# Patient Record
Sex: MALE | Race: OTHER | NOT HISPANIC OR LATINO | Employment: OTHER | RURAL
[De-identification: names, ages, dates, MRNs, and addresses within clinical notes are randomized per-mention and may not be internally consistent; named-entity substitution may affect disease eponyms.]

---

## 2021-01-06 ENCOUNTER — HISTORICAL (OUTPATIENT)
Dept: ADMINISTRATIVE | Facility: HOSPITAL | Age: 66
End: 2021-01-06

## 2021-01-06 LAB — SARS-COV+SARS-COV-2 AG RESP QL IA.RAPID: NEGATIVE

## 2023-04-19 PROCEDURE — 88312 PATHOLOGY, DERMATOLOGY: ICD-10-PCS | Mod: 26,,, | Performed by: PATHOLOGY

## 2023-04-19 PROCEDURE — 88312 SPECIAL STAINS GROUP 1: CPT | Mod: 26,,, | Performed by: PATHOLOGY

## 2023-04-19 PROCEDURE — 88305 PATHOLOGY, DERMATOLOGY: ICD-10-PCS | Mod: 26,,, | Performed by: PATHOLOGY

## 2023-04-19 PROCEDURE — 88305 TISSUE EXAM BY PATHOLOGIST: CPT | Mod: 26,,, | Performed by: PATHOLOGY

## 2023-04-19 PROCEDURE — 88305 TISSUE EXAM BY PATHOLOGIST: CPT | Mod: TC,SUR

## 2023-04-20 ENCOUNTER — LAB REQUISITION (OUTPATIENT)
Dept: LAB | Facility: HOSPITAL | Age: 68
End: 2023-04-20
Payer: MEDICARE

## 2023-04-20 DIAGNOSIS — D49.2 NEOPLASM OF UNSPECIFIED BEHAVIOR OF BONE, SOFT TISSUE, AND SKIN: ICD-10-CM

## 2023-04-21 LAB
ESTROGEN SERPL-MCNC: NORMAL PG/ML
INSULIN SERPL-ACNC: NORMAL U[IU]/ML
LAB AP GROSS DESCRIPTION: NORMAL
LAB AP LABORATORY NOTES: NORMAL
LAB AP SPEC A DDX: NORMAL
LAB AP SPEC A MORPHOLOGY: NORMAL
LAB AP SPEC A PROCEDURE: NORMAL
T3RU NFR SERPL: NORMAL %

## 2023-08-10 LAB — HBA1C MFR BLD: 7.9 % (ref 4–6)

## 2023-10-09 ENCOUNTER — OFFICE VISIT (OUTPATIENT)
Dept: FAMILY MEDICINE | Facility: CLINIC | Age: 68
End: 2023-10-09
Payer: MEDICARE

## 2023-10-09 DIAGNOSIS — E11.65 TYPE 2 DIABETES MELLITUS WITH HYPERGLYCEMIA, WITHOUT LONG-TERM CURRENT USE OF INSULIN: Chronic | ICD-10-CM

## 2023-10-09 DIAGNOSIS — I10 ESSENTIAL HYPERTENSION: Chronic | ICD-10-CM

## 2023-10-09 DIAGNOSIS — R06.83 SNORING: ICD-10-CM

## 2023-10-09 DIAGNOSIS — Z23 NEED FOR PNEUMOCOCCAL VACCINE: ICD-10-CM

## 2023-10-09 DIAGNOSIS — Z12.11 SCREENING FOR MALIGNANT NEOPLASM OF COLON: ICD-10-CM

## 2023-10-09 DIAGNOSIS — Z23 FLU VACCINE NEED: ICD-10-CM

## 2023-10-09 DIAGNOSIS — R53.83 OTHER FATIGUE: Primary | Chronic | ICD-10-CM

## 2023-10-09 DIAGNOSIS — L30.9 CHRONIC DERMATITIS: ICD-10-CM

## 2023-10-09 PROCEDURE — G0009 PNEUMOCOCCAL CONJUGATE VACCINE 20-VALENT: ICD-10-PCS | Mod: ,,, | Performed by: NURSE PRACTITIONER

## 2023-10-09 PROCEDURE — G0008 FLU VACCINE - QUADRIVALENT - ADJUVANTED: ICD-10-PCS | Mod: ,,, | Performed by: NURSE PRACTITIONER

## 2023-10-09 PROCEDURE — G0009 ADMIN PNEUMOCOCCAL VACCINE: HCPCS | Mod: ,,, | Performed by: NURSE PRACTITIONER

## 2023-10-09 PROCEDURE — G0008 ADMIN INFLUENZA VIRUS VAC: HCPCS | Mod: ,,, | Performed by: NURSE PRACTITIONER

## 2023-10-09 PROCEDURE — 90694 VACC AIIV4 NO PRSRV 0.5ML IM: CPT | Mod: ,,, | Performed by: NURSE PRACTITIONER

## 2023-10-09 PROCEDURE — 90677 PCV20 VACCINE IM: CPT | Mod: ,,, | Performed by: NURSE PRACTITIONER

## 2023-10-09 PROCEDURE — 90694 FLU VACCINE - QUADRIVALENT - ADJUVANTED: ICD-10-PCS | Mod: ,,, | Performed by: NURSE PRACTITIONER

## 2023-10-09 PROCEDURE — 99204 PR OFFICE/OUTPT VISIT, NEW, LEVL IV, 45-59 MIN: ICD-10-PCS | Mod: ,,, | Performed by: NURSE PRACTITIONER

## 2023-10-09 PROCEDURE — 90677 PNEUMOCOCCAL CONJUGATE VACCINE 20-VALENT: ICD-10-PCS | Mod: ,,, | Performed by: NURSE PRACTITIONER

## 2023-10-09 PROCEDURE — 99204 OFFICE O/P NEW MOD 45 MIN: CPT | Mod: ,,, | Performed by: NURSE PRACTITIONER

## 2023-10-09 RX ORDER — EMPAGLIFLOZIN 25 MG/1
25 TABLET, FILM COATED ORAL DAILY
COMMUNITY
Start: 2023-09-13 | End: 2024-03-26 | Stop reason: SDUPTHER

## 2023-10-09 RX ORDER — PIOGLITAZONEHYDROCHLORIDE 45 MG/1
45 TABLET ORAL DAILY
COMMUNITY
Start: 2023-09-13

## 2023-10-09 RX ORDER — OMEPRAZOLE 20 MG/1
20 CAPSULE, DELAYED RELEASE ORAL DAILY
COMMUNITY
Start: 2023-07-19 | End: 2024-03-26 | Stop reason: ALTCHOICE

## 2023-10-09 RX ORDER — FERROUS SULFATE 325(65) MG
325 TABLET ORAL
COMMUNITY

## 2023-10-09 RX ORDER — NIACIN 500 MG
250 CAPSULE, EXTENDED RELEASE ORAL NIGHTLY
COMMUNITY

## 2023-10-09 RX ORDER — ASPIRIN 325 MG
325 TABLET ORAL DAILY
COMMUNITY

## 2023-10-09 RX ORDER — GLIMEPIRIDE 4 MG/1
4 TABLET ORAL 2 TIMES DAILY
COMMUNITY
Start: 2023-07-19

## 2023-10-09 RX ORDER — CHOLECALCIFEROL (VITAMIN D3) 25 MCG
1 TABLET,CHEWABLE ORAL DAILY
COMMUNITY

## 2023-10-09 RX ORDER — NAPROXEN SODIUM 220 MG/1
1 TABLET ORAL 2 TIMES DAILY
COMMUNITY

## 2023-10-09 RX ORDER — EZETIMIBE 10 MG/1
10 TABLET ORAL DAILY
COMMUNITY
Start: 2023-07-19

## 2023-10-09 NOTE — ASSESSMENT & PLAN NOTE
Has been seeing Derm NP w/ Dr. Criss Ferris.  Had biopsy.  Rash persistent to RUE and desires 2nd opinion.

## 2023-10-09 NOTE — PATIENT INSTRUCTIONS
Patient Education       Preventing Falls   The Basics   Written by the doctors and editors at [facility]   Am I at risk of falling? -- Your risk of falling increases as you grow older. That's because getting older can make it harder to walk steadily and keep your balance. Also, the effects of falls are more serious in older people.  Overall, 3 to 4 out of every 10 people over the age of 65 fall each year. Up to 75 percent of people who fracture a hip never recover to the point they were before they had their fracture. If you have fallen in the past, you are at higher risk of falling again.  Several things can increase your risk of a fall, including:  Illness  A change in the medicines you take  An unsafe or unfamiliar setting (for example, a room with rugs or furniture that might trip you, or an area you don't know well)  How can my doctor help me to avoid falling? -- Your doctor can talk to you about the following things:  Past falls - It is important to tell your doctor about any times you have fallen or almost fallen. He or she can then suggest ways to prevent another fall.  Your health conditions - Some health problems can put you at risk of falling. These include conditions that affect eyesight, hearing, muscle strength, or balance.  The medicines you take - Certain medicines can increase the risk of falling. These include some medicines that are used for sleeping problems, anxiety, high blood pressure, or depression. Adding new medicines, or changing doses of some medicines, can also affect your risk of falling.  The more your doctor knows about your situation, the better he or she will be able to help you. For example, if you fell because you have a condition that causes pain, your doctor might suggest treatments to deal with the pain. Or if one of your medicines is making you dizzy and more likely to fall, your doctor might switch you to a different medicine.  Is there anything I can do on my own? -- Yes. To  help keep from falling, you can:  Make your home safer - To avoid falling at home, get rid of things that might make you trip or slip. This might include furniture, electrical cords, clutter, and loose rugs (figure 1). Keep your home well-lit so that you can easily see where you are going. Avoid storing things in high places so you don't have to reach or climb.  Wear sturdy shoes that fit well - Wearing shoes with high heels or slippery soles, or shoes that are too loose, can lead to falls. Walking around in bare feet, or only socks, can also increase your risk of falling.  Take vitamin D pills - Taking vitamin D might lower the risk of falls in older people. This is because vitamin D helps make bones and muscles stronger. Your doctor can talk to you about whether you should take extra vitamin D, and how much.  Stay active - Exercising on a regular basis can help lower your risk of falling. It might also help prevent you from getting hurt if you do fall. It is best to do a few different activities that help with both strength and balance. There are many kinds of exercise that can be safe for older people. These include walking, swimming, and Marcus Chi (a Chinese martial art that involves slow, gentle movements).  Use a cane, walker, and other safety devices - If your doctor recommends that you use a cane or walker, be sure that it's the right size and you know how to use it. There are other devices that might help you avoid falling, too. These include grab bars or a sturdy seat for the shower, non-slip bath mats, and hand rails or treads for the stairs (to prevent slipping).  If you worry that you could fall, there are also alarm buttons that let you call for help if you fall and can't get up.  What should I do if I fall? -- If you fall, see your doctor right away, even if you aren't hurt. Your doctor can try to figure out what caused you to fall, and how likely you are to fall again. He or she will do an exam and  talk to you about your health problems, medicines, and activities. Then he or she can suggest things you can do to avoid falling again.  Many older people have a hard time recovering after a fall. Doing things to prevent falling can help you to protect your health and independence.  All topics are updated as new evidence becomes available and our peer review process is complete.  This topic retrieved from [employer] on: Sep 21, 2021.  Topic 54521 Version 18.0  Release: 29.4.2 - C29.263  © 2021 UpToDate, Inc. and/or its affiliates. All rights reserved.  figure 1: How to avoid falling at home     This picture shows some of the things that can cause a fall in your home. Look around and remove any loose rugs, electrical cords, clutter, or furniture that could trip you.  Graphic 10205 Version 1.0    Consumer Information Use and Disclaimer   This information is not specific medical advice and does not replace information you receive from your health care provider. This is only a brief summary of general information. It does NOT include all information about conditions, illnesses, injuries, tests, procedures, treatments, therapies, discharge instructions or life-style choices that may apply to you. You must talk with your health care provider for complete information about your health and treatment options. This information should not be used to decide whether or not to accept your health care provider's advice, instructions or recommendations. Only your health care provider has the knowledge and training to provide advice that is right for you. The use of this information is governed by the [employer] End User License Agreement, available at https://www.[place].[employer]/en/solutions/[employer]/about/catrina.The use of [employer] content is governed by the [employer] Terms of Use. ©2021 UpToDate, Inc. All rights reserved.  Copyright   © 2021 UpToDate, Inc. and/or its affiliates. All rights reserved.  Patient Education       Type 2  "Diabetes   The Basics   Written by the doctors and editors at [facility]   What is type 2 diabetes? -- Type 2 diabetes is a disorder that disrupts the way your body uses sugar. It is sometimes called type 2 diabetes mellitus.  All the cells in your body need sugar to work normally. Sugar gets into the cells with the help of a hormone called insulin. Insulin is made by the pancreas, an organ in the belly. If there is not enough insulin, or if your body stops responding to insulin, sugar builds up in the blood. That is what happens to people with diabetes.  There are two different types of diabetes:   Type 1 diabetes - In type 1 diabetes, the pancreas does not make insulin or makes very little insulin.  Type 2 diabetes - In most people with type 2 diabetes, the body stops responding to insulin normally. Then, over time, the pancreas stops making enough insulin.   Being overweight or obese increases a person's risk of developing type 2 diabetes. But people who are not overweight can get diabetes, too.  What are the symptoms of type 2 diabetes? -- Type 2 diabetes usually causes no symptoms. When symptoms do occur, they include:  Needing to urinate often  Intense thirst  Blurry vision  Can diabetes lead to other health problems? -- Yes. Type 2 diabetes might not make you feel sick. But if it is not managed, it can lead to serious problems over time, such as:  Heart attacks  Strokes  Kidney disease  Vision problems (or even blindness)  Pain or loss of feeling in the hands and feet  Needing to have fingers, toes, or other body parts removed (amputated)  How do I know if I have type 2 diabetes? -- To find out if you have type 2 diabetes, your doctor or nurse can do a blood test. There are 2 tests that can be used for this. Both involve measuring the amount of sugar in your blood, called your "blood sugar" or "blood glucose":   One of the tests measures your blood sugar at the time the blood sample is taken. This test is done " "in the morning. You can't eat or drink anything except water for at least 8 hours before the test.   The other test shows what your average blood sugar has been for the past 2 to 3 months. This blood test is called "hemoglobin A1C" or just "A1C." It can be checked at any time of the day, even if you have recently eaten.  How is type 2 diabetes treated? -- The goals of treatment are to control your blood sugar and lower the risk of future problems that can happen in people with diabetes. An important part of managing diabetes is to eat healthy foods and get plenty of physical activity.  There are a few medicines that help control blood sugar. Some people need to take pills that help the body make more insulin or that help insulin do its job. Others need insulin shots.  Depending on what medicines you take, you might need to check your blood sugar regularly at home. But not everyone with type 2 diabetes needs to do this. Your doctor or nurse will tell you if you should be checking your blood sugar, and when and how to do this.  Sometimes, people with type 2 diabetes also need medicines to reduce the problems caused by the disease. For instance, medicines used to lower blood pressure can reduce the chances of a heart attack or stroke.  It's also important to get certain vaccines, such as vaccines to protect against the flu and coronavirus disease 2019 (COVID-19). Some people also need a vaccine to prevent pneumonia.  Can type 2 diabetes be prevented? -- Yes. To lower your chances of getting type 2 diabetes, the most important thing you can do is eat a healthy diet and get plenty of physical activity. This can help you lose weight if you are overweight. But eating well and being active are also good for your overall health. Even gentle activity, like walking, has benefits.  If you smoke, quitting can also lower your risk of type 2 diabetes. Quitting smoking can be hard to do, but your doctor or nurse can help.  All " topics are updated as new evidence becomes available and our peer review process is complete.  This topic retrieved from [employer] on: Sep 21, 2021.  Topic 59376 Version 14.0  Release: 29.4.2 - C29.263  © 2021 UpToDate, Inc. and/or its affiliates. All rights reserved.  Consumer Information Use and Disclaimer   This information is not specific medical advice and does not replace information you receive from your health care provider. This is only a brief summary of general information. It does NOT include all information about conditions, illnesses, injuries, tests, procedures, treatments, therapies, discharge instructions or life-style choices that may apply to you. You must talk with your health care provider for complete information about your health and treatment options. This information should not be used to decide whether or not to accept your health care provider's advice, instructions or recommendations. Only your health care provider has the knowledge and training to provide advice that is right for you. The use of this information is governed by the [employer] End User License Agreement, available at https://www.[employer]/en/solutions/[employer]/about/catrina.The use of [employer] content is governed by the [employer] Terms of Use. ©2021 UpToDate, Inc. All rights reserved.  Copyright   © 2021 UpToDate, Inc. and/or its affiliates. All rights reserved.  Patient Education       Lowering Your Risk of High Blood Pressure   About this topic   High blood pressure happens when your heart must work harder than normal to pump blood to the body. Blood pressure measures the pressure in your arteries when your heart beats. Your arteries are tubes that carry blood from your heart to the rest of your body. If the arteries are clogged, stiff, or squeeze too tightly, the pressure goes up and your heart must work harder than normal.  Your blood pressure has two numbers. The top number is the systolic number. It measures the  highest amount of pressure in the artery when the heart is beating. The second number or bottom number is the diastolic number. It is the lowest pressure in the artery when the heart is resting.  Sometimes diseases, like kidney disease or abnormal hormones, can lead to high blood pressure. Most people have no known cause or reason for high blood pressure.  In most cases, high blood pressure cannot be cured. You must control it with drugs and lifestyle changes. If high blood pressure is not controlled, it can lead to heart attack, stroke, and kidney problems.  Many people with high blood pressure do not feel sick and dont know that they have it until their blood pressure is checked. However, this does not mean that you do not need treatment for high blood pressure.  General   Many things can raise your chances of having high blood pressure. Some of them you can control and others you cannot. It is important to know about all of them.  Some health conditions may raise your chances for having high blood pressure. Take extra care and work with your doctor to keep these health problems under control. Your risk for high blood pressure is higher if you have:  Diabetes  Kidney disease  High cholesterol  Obstructive sleep apnea  Hormone problems - thyroid disease, Cushings syndrome, acromegaly, hyperaldosteronism, and pheochromocytoma  Lupus  Scleroderma  You may have control over some things that make you more likely to have high blood pressure. It is important to know about them and work to keep these problems under control. You are at a higher risk of high blood pressure if you:  Smoke or use tobacco  Use alcohol or illegal drugs  Do not exercise regularly  Are overweight  Have a lot of stress in your life  Have a poor diet or a diet high in salt or sodium  Have a diet low in potassium  Take certain medications  Some things you are not able to control, but they are important to know about. For example, men are more  likely to have high blood pressure before about age 60. However, women are more likely to have high blood pressure after menopause. You are also at a higher risk for high blood pressure if you:  Are older. Your risk gets higher the older you are.  Have a family history of high blood pressure  Are   What lifestyle changes are needed?   You may be asked to get a blood pressure monitor to use at home. Learn how to use it and record your blood pressure results between doctor visits.  Stop smoking and using tobacco. Smoking causes your arteries to narrow and raises your blood pressure. Talk to your doctor if you need help quitting.  Limit how much alcohol you drink to no more than 1 drink a day for women or 2 drinks a day for men.  Do not use illegal drugs. Talk to your doctor if you need help quitting.  If you are under a great deal of stress, ask your doctor for advice on how to lower the stress. You may need to learn a variety of stress reduction methods, such as yoga, meditation, guided imagery, donna chi, or even have a drug prescribed to help you.  Eat a healthy, well-balanced diet. Try to limit how much salt or sodium you eat each day.  Lose weight if you are overweight.  Get regular exercise. This can help your heart pump better, lower your blood pressure, and help you lose weight.  Work with your doctor to treat problems like sleep apnea, diabetes, high cholesterol, and kidney problems.  Talk with your doctor or pharmacist about all of your drugs, including over-the-counter medicines, to see if they may cause high blood pressure.  What drugs may be needed?   Your doctor may order drugs to:  Lower blood pressure  Control blood sugar  Lower cholesterol levels  Help with your mood  Help you stop using tobacco  Take your drugs exactly as ordered. Controlling problems like high blood pressure, diabetes, or high cholesterol are all ways to lower your chances of having high blood pressure.  Will physical  activity be limited?   It is good to get some kind of exercise each day. Walking, gardening, swimming, riding a bike, or dancing are all good ways to add exercise to your life. Always check with your doctor if you have questions about starting an exercise program.  What changes to diet are needed?   Eat a healthy diet. Talk to your doctor or a dietitian if you need to lose weight.  Do not use salt on your food. Use herbs and spices to improve the taste.  Do not eat more than 2.3 grams of sodium a day. If you have high blood pressure, aim for 1.5 grams of sodium a day. Read food labels to see how much sodium is in a food.  Limit coffee, tea, and soda to 2 cups (480 mL) a day.  Eat lots of fruits, vegetables, and low-fat dairy products.  Avoid fatty foods, like fried foods or chips.  Talk with your dietitian about the diet changes you need and the number of calories you should eat each day.  When do I need to call the doctor?   Activate the emergency medical system right away if you have signs of a heart attack. Call 911 in the [place] or [place]. The sooner treatment begins, the better your chances for recovery. Call for emergency help right away if you have:  Signs of heart attack:  Chest pain  Pain in other areas of the upper body (either or both arms, jaw, neck, etc.)  Trouble breathing  Fast heartbeat  Feeling dizzy  Signs of stroke:  Sudden numbness or weakness of the face, arm, or leg, especially on one side of the body  Sudden confusion, trouble speaking or understanding  Sudden trouble seeing in one or both eyes  Sudden trouble walking, dizziness, loss of balance or coordination  Sudden severe headache with no known cause  Face droops on one side  Call your doctor if you have:  Blood pressure that is 20 points higher than your normal top or bottom number  Two blood pressure readings higher than 180/120  Very bad headache  Confusion  Sudden change in hearing or eyesight  Nosebleed  Where can I learn more?    American Heart Association  https://www.heart.org/en/health-topics/high-blood-pressure/changes-you-can-make-to-manage-high-blood-pressure   American Heart Association  https://www.heart.org/en/health-topics/high-blood-pressure/why-high-blood-pressure-is-a-silent-killer/know-your-risk-factors-for-high-blood-pressure   Centers for Disease Control and Prevention  https://www.cdc.gov/bloodpressure/risk_factors.htm   NHS Choices  https://www.nhs.uk/conditions/high-blood-pressure-hypertension/   Last Reviewed Date   2020-03-26  Consumer Information Use and Disclaimer   This information is not specific medical advice and does not replace information you receive from your health care provider. This is only a brief summary of general information. It does NOT include all information about conditions, illnesses, injuries, tests, procedures, treatments, therapies, discharge instructions or life-style choices that may apply to you. You must talk with your health care provider for complete information about your health and treatment options. This information should not be used to decide whether or not to accept your health care providers advice, instructions or recommendations. Only your health care provider has the knowledge and training to provide advice that is right for you.  Copyright   Copyright © 2021 UpToDate, Inc. and its affiliates and/or licensors. All rights reserved.

## 2023-10-09 NOTE — PROGRESS NOTES
[facility] - FAMILY MEDICINE       PATIENT NAME: Hung Turner   : 1955    AGE: 68 y.o. DATE OF ENCOUNTER: 10/9/23    MRN: [id number]      PCP: No, Primary Doctor    Reason for Visit / Chief Complaint:  Diabetes (Patient presents to the clinic question about dm )         274}    Subjective:     HPI:    Presents with his wife to establish new PCP.  No old records available at time of visit.  Wasn't supposed to see LUIS Powers FNP again until February, but saw her early due to fatigue and requested labs 1 mth ago; saw Marj Luke NP until she left [facility] and she was the one who added Jardiance.       Dx w/ T2DM > 10 yrs ago.  Was on metformin for years, but didn't feel well.   Checks glucose most days of the week and glucose fluctuates a lot - since last week glucose lo 144 161 148 134 153.  Feels bad if glucose drops to 80s.  Feels best 110-120s.  Reports increased urination since starting Jardiance; 3-4x night nocturia.     Is supposed to wear compression stockings but hard to get them on.  No prior sleep study; per wife he snores.    Was started on iron, B12, & magnesium for low energy, but hasn't noticed any improvement.     Review of Systems:   Review of Systems   Constitutional:  Positive for fatigue.   Respiratory:  Positive for shortness of breath (with minimal exertion). Negative for cough and wheezing.    Cardiovascular: Negative.    Skin: Negative.        Allergies and Meds: 274}     Review of patient's allergies indicates:   Allergen Reactions    Codeine     Crestor [rosuvastatin]     Penicillins         Current Outpatient Medications:     aspirin 325 MG tablet, Take 325 mg by mouth once daily., Disp: , Rfl:     cyanocobalamin, vitamin B-12, 2,500 mcg Tab, Take 1 tablet by mouth once daily., Disp: , Rfl:     ezetimibe (ZETIA) 10 mg tablet, Take 10 mg by mouth once daily., Disp: , Rfl:     ferrous sulfate (FEOSOL) 325 mg (65 mg iron) Tab tablet, Take 325 mg by mouth daily with  "breakfast., Disp: , Rfl:     glimepiride (AMARYL) 4 MG tablet, Take 4 mg by mouth once daily., Disp: , Rfl:     JARDIANCE 25 mg tablet, Take 25 mg by mouth once daily., Disp: , Rfl:     magnesium citrate 85 mg Chew, Take 2 capsules by mouth nightly., Disp: , Rfl:     niacin 500 MG CpSR, Take 250 mg by mouth every evening., Disp: , Rfl:     omega 3-dha-epa-fish oil (FISH OIL) 1,200 (144-216) mg Cap, Take 1 capsule by mouth 2 (two) times a day., Disp: , Rfl:     omeprazole (PRILOSEC) 20 MG capsule, Take 20 mg by mouth once daily., Disp: , Rfl:     pioglitazone (ACTOS) 45 MG tablet, Take 45 mg by mouth once daily., Disp: , Rfl:     Medical History: 274}     Past Medical History:   Diagnosis Date    Type 2 diabetes mellitus with hyperglycemia, without long-term current use of insulin 10/09/2023      Social History     Tobacco Use   Smoking Status Never   Smokeless Tobacco Never      Past Surgical History:   Procedure Laterality Date    CATHETERIZATION OF BOTH LEFT AND RIGHT HEART      COSMETIC SURGERY      bone cartilage removal from chest    HERNIA REPAIR      WISDOM TOOTH EXTRACTION        Immunization History   Administered Date(s) Administered    Influenza (FLUAD) - Quadrivalent - Adjuvanted - PF *Preferred* (65+) 10/09/2023    Pneumococcal Conjugate - 20 Valent 10/09/2023     Objective:  274}   BP (!) 141/80 (BP Location: Right arm, Patient Position: Sitting, BP Method: Large (Automatic))   Pulse 80   Temp 98.5 °F (36.9 °C) (Oral)   Resp 20   Ht 5' 7" (1.702 m)   Wt 99.6 kg (219 lb 9.6 oz)   SpO2 97%   BMI 34.39 kg/m²     Wt Readings from Last 3 Encounters:   10/09/23 99.6 kg (219 lb 9.6 oz)     BP Readings from Last 3 Encounters:   10/09/23 (!) 141/80     Body mass index is 34.39 kg/m².     Physical Exam  Vitals and nursing note reviewed.   Constitutional:       General: He is not in acute distress.     Appearance: Normal appearance.   HENT:      Head: Normocephalic.      Right Ear: Tympanic membrane, ear " canal and external ear normal.      Left Ear: Tympanic membrane, ear canal and external ear normal.      Nose: Nose normal.      Mouth/Throat:      Mouth: Mucous membranes are moist.      Pharynx: Oropharynx is clear.   Eyes:      Conjunctiva/sclera: Conjunctivae normal.      Pupils: Pupils are equal, round, and reactive to light.   Neck:      Thyroid: No thyromegaly.      Vascular: Normal carotid pulses. No carotid bruit.   Cardiovascular:      Rate and Rhythm: Normal rate and regular rhythm.      Pulses: Normal pulses.      Heart sounds: Normal heart sounds.   Pulmonary:      Effort: Pulmonary effort is normal. No respiratory distress.      Breath sounds: Normal breath sounds.   Abdominal:      Palpations: Abdomen is soft.      Tenderness: There is no abdominal tenderness.   Musculoskeletal:      Cervical back: Neck supple.      Right lower leg: No edema.      Left lower leg: No edema.   Lymphadenopathy:      Cervical: No cervical adenopathy.   Skin:     General: Skin is warm and dry.   Neurological:      General: No focal deficit present.      Mental Status: He is alert and oriented to person, place, and time.   Psychiatric:         Mood and Affect: Mood normal.         Behavior: Behavior normal.          Assessment and Plan: 274}     1. Other fatigue    2. Type 2 diabetes mellitus with hyperglycemia, without long-term current use of insulin  Comments:  fluctuating glucose levels; request labs from former PCP and determine when A1c is due again and make possible treatment plan change(s)  Orders:  -     Ambulatory referral/consult to Sleep Disorders; Future; Expected date: 10/16/2023    3. Essential hypertension    4. Chronic dermatitis  Assessment & Plan:  Has been seeing Derm NP w/ Dr. Criss Ferris.  Had biopsy.  Rash persistent to RUE and desires 2nd opinion.    Orders:  -     Ambulatory referral/consult to Dermatology; Future; Expected date: 10/16/2023    5. Screening for malignant neoplasm of colon  -      Cologuard Screening (Multitarget Stool DNA); Future; Expected date: 10/09/2023    6. Flu vaccine need  -     Influenza - Quadrivalent (Adjuvanted)    7. Need for pneumococcal vaccine  -     Pneumococcal Conjugate Vaccine (20 Valent) (IM)    8. Snoring  -     Ambulatory referral/consult to Sleep Disorders; Future; Expected date: 10/16/2023    Get records from former PCP Lillian Powers NP and from [facility] for my review.  Will update labs as indicated after review.   Pt prefers Cologuard for colon screening.  High dose flu shot & Prevnar-20 today    Sleep study eval recommended.  We discussed the potential ramifications of untreated sleep apnea, which could include daytime fatigue, weight gain, nocturia, hypertension, heart disease including CHF, uncontrolled diabetes, increased risk for dementia/memory impairment, sudden death while sleeping and/or stroke. The patient should abstain from driving if he feels sleepy or drowsy.      Advised to check on getting Shingrix and TDAP at local pharmacy due to Medicare doesn't cover these vaccines in office.    Return to clinic 6 wk f/u T2DM, HTN, & fatigue; and sooner as needed.    Future Appointments   Date Time Provider Department Center   10/25/2023 10:00 AM AMBER NURSE, [facility] FAMILY MEDICINE [facility] DONNIE Melton        Signature:  HOSSEIN Lambert

## 2023-10-10 VITALS
SYSTOLIC BLOOD PRESSURE: 132 MMHG | OXYGEN SATURATION: 97 % | HEIGHT: 67 IN | DIASTOLIC BLOOD PRESSURE: 78 MMHG | TEMPERATURE: 99 F | RESPIRATION RATE: 20 BRPM | BODY MASS INDEX: 34.47 KG/M2 | HEART RATE: 80 BPM | WEIGHT: 219.63 LBS

## 2023-10-18 NOTE — PROGRESS NOTES
"[facility] FAMILY MEDICINE       PATIENT NAME: Hung Turner   : 1955    AGE: 68 y.o. DATE OF ENCOUNTER: 10/25/23    MRN: [id number]      Hung Turner presented for a  Medicare AWV and comprehensive Health Risk Assessment today. The following components were reviewed and updated:    Medical history  Family History  Social history  Allergies and Current Medications  Health Risk Assessment  Health Maintenance  Care Team         ** See Completed Assessments for Annual Wellness Visit within the encounter summary.**           The following assessments were completed:  Living Situation  CAGE  Depression Screening  Timed Get Up and Go  Whisper Test  Cognitive Function Screening  Nutrition Screening  ADL Screening  PAQ Screening        Vitals:    10/25/23 1004   BP: 124/76   BP Location: Right arm   Patient Position: Sitting   Pulse: 78   Resp: 16   Temp: 98.6 °F (37 °C)   TempSrc: Oral   SpO2: 96%   Weight: 101.2 kg (223 lb)   Height: 5' 8" (1.727 m)     Body mass index is 33.91 kg/m².  Physical Exam  Vitals and nursing note reviewed.   Constitutional:       General: He is not in acute distress.     Appearance: Normal appearance. He is not ill-appearing.   HENT:      Head: Normocephalic.   Eyes:      Conjunctiva/sclera: Conjunctivae normal.   Cardiovascular:      Rate and Rhythm: Normal rate and regular rhythm.      Heart sounds: Normal heart sounds.   Pulmonary:      Effort: Pulmonary effort is normal. No respiratory distress.      Breath sounds: Normal breath sounds.   Skin:     General: Skin is warm and dry.   Neurological:      Mental Status: He is alert and oriented to person, place, and time.             Diagnoses and health risks identified today and associated recommendations/orders:    1. Class 1 obesity due to excess calories with serious comorbidity and body mass index (BMI) of 33.0 to 33.9 in adult  Comments:  Healthy diet with reduced portions, increased physical activity, and " weight loss encouraged.    2. Encounter for subsequent annual wellness visit (AWV) in Medicare patient    3. Essential hypertension  Comments:  Controlled, continue current treatment.    4. Type 2 diabetes mellitus with hyperglycemia, without long-term current use of insulin  Assessment & Plan:  Last A1c 7.9% 8/10/23 per previous PCP.  Unable to tolerate metformin.  On glimepiride, pioglitazone, & Jardiance.      5. Need for vaccination  -     diphth,pertus,acell,,tetanus (BOOSTRIX) 2.5-8-5 Lf-mcg-Lf/0.5mL Susp; Inject 0.5 mLs into the muscle once. for 1 dose  Dispense: 0.5 mL; Refill: 0  -     varicella-zoster gE-AS01B, PF, (SHINGRIX, PF,) 50 mcg/0.5 mL injection; Inject 0.5 mLs into the muscle once. for 1 dose  Dispense: 1 each; Refill: 0    6. Screening for viral disease  -     Hepatitis C Antibody; Future; Expected date: 10/25/2023    7. BMI 33.0-33.9,adult    8. Snoring  Comments:  Appt pending for JUVE testing.  JUVE strongly suspected.  Overview:  Appt pending for JUVE testing.  JUVE strongly suspected.      9. Venous insufficiency (chronic) (peripheral)  Overview:  Mild, stable per Cardiologist, Dr. Ellison' notes.  Sleep study recommended and compression stockings.      10. Myalgia due to statin  Overview:  Unable to take statins.      11. Hypercholesteremia  Overview:  Can't take statins.  Fairly well controlled with zetia.        Provided Hung with a 5-10 year written screening schedule and personal prevention plan. Recommendations were developed using the USPSTF age appropriate recommendations. Education, counseling, and referrals were provided as needed. After Visit Summary printed and given to patient which includes a list of additional screenings\tests needed.    Follow up in about 1 year (around 10/25/2024).    HOSSEIN Lambert    Hep C Screening - drawn this visit, Tetanus vaccine - order sent to pharmacy, Shingles vaccine - order sent to pharmacy    I offered to discuss advanced care planning,  including how to pick a person who would make decisions for you if you were unable to make them for yourself, called a health care power of , and what kind of decisions you might make such as use of life sustaining treatments such as ventilators and tube feeding when faced with a life limiting illness recorded on a living will that they will need to know. (How you want to be cared for as you near the end of your natural life)     X Patient is interested in learning more about how to make advanced directives.  I provided them paperwork and offered to discuss this with them.

## 2023-10-25 ENCOUNTER — OFFICE VISIT (OUTPATIENT)
Dept: FAMILY MEDICINE | Facility: CLINIC | Age: 68
End: 2023-10-25
Payer: MEDICARE

## 2023-10-25 VITALS
HEART RATE: 78 BPM | SYSTOLIC BLOOD PRESSURE: 124 MMHG | BODY MASS INDEX: 33.8 KG/M2 | WEIGHT: 223 LBS | OXYGEN SATURATION: 96 % | RESPIRATION RATE: 16 BRPM | HEIGHT: 68 IN | DIASTOLIC BLOOD PRESSURE: 76 MMHG | TEMPERATURE: 99 F

## 2023-10-25 DIAGNOSIS — E78.00 HYPERCHOLESTEREMIA: Chronic | ICD-10-CM

## 2023-10-25 DIAGNOSIS — I10 ESSENTIAL HYPERTENSION: Chronic | ICD-10-CM

## 2023-10-25 DIAGNOSIS — Z23 NEED FOR VACCINATION: ICD-10-CM

## 2023-10-25 DIAGNOSIS — E11.65 TYPE 2 DIABETES MELLITUS WITH HYPERGLYCEMIA, WITHOUT LONG-TERM CURRENT USE OF INSULIN: Chronic | ICD-10-CM

## 2023-10-25 DIAGNOSIS — Z00.00 ENCOUNTER FOR SUBSEQUENT ANNUAL WELLNESS VISIT (AWV) IN MEDICARE PATIENT: ICD-10-CM

## 2023-10-25 DIAGNOSIS — E66.09 CLASS 1 OBESITY DUE TO EXCESS CALORIES WITH SERIOUS COMORBIDITY AND BODY MASS INDEX (BMI) OF 33.0 TO 33.9 IN ADULT: Primary | Chronic | ICD-10-CM

## 2023-10-25 DIAGNOSIS — M79.10 MYALGIA DUE TO STATIN: Chronic | ICD-10-CM

## 2023-10-25 DIAGNOSIS — T46.6X5A MYALGIA DUE TO STATIN: Chronic | ICD-10-CM

## 2023-10-25 DIAGNOSIS — Z11.59 SCREENING FOR VIRAL DISEASE: ICD-10-CM

## 2023-10-25 DIAGNOSIS — I87.2 VENOUS INSUFFICIENCY (CHRONIC) (PERIPHERAL): Chronic | ICD-10-CM

## 2023-10-25 DIAGNOSIS — R06.83 SNORING: ICD-10-CM

## 2023-10-25 PROBLEM — E66.811 CLASS 1 OBESITY DUE TO EXCESS CALORIES WITH SERIOUS COMORBIDITY AND BODY MASS INDEX (BMI) OF 33.0 TO 33.9 IN ADULT: Chronic | Status: ACTIVE | Noted: 2023-10-25

## 2023-10-25 LAB — HCV AB SER QL: NORMAL

## 2023-10-25 PROCEDURE — 86803 HEPATITIS C ANTIBODY: ICD-10-PCS | Mod: ,,, | Performed by: CLINICAL MEDICAL LABORATORY

## 2023-10-25 PROCEDURE — G0439 PR MEDICARE ANNUAL WELLNESS SUBSEQUENT VISIT: ICD-10-PCS | Mod: ,,, | Performed by: NURSE PRACTITIONER

## 2023-10-25 PROCEDURE — G0439 PPPS, SUBSEQ VISIT: HCPCS | Mod: ,,, | Performed by: NURSE PRACTITIONER

## 2023-10-25 PROCEDURE — 86803 HEPATITIS C AB TEST: CPT | Mod: ,,, | Performed by: CLINICAL MEDICAL LABORATORY

## 2023-10-25 RX ORDER — ZOSTER VACCINE RECOMBINANT, ADJUVANTED 50 MCG/0.5
0.5 KIT INTRAMUSCULAR ONCE
Qty: 1 EACH | Refills: 0 | Status: SHIPPED | OUTPATIENT
Start: 2023-10-25 | End: 2023-10-25

## 2023-10-25 NOTE — PATIENT INSTRUCTIONS
Counseling and Referral of Other Preventative  (Italic type indicates deductible and co-insurance are waived)    Patient Name: Hung Turner  Today's Date: 10/25/2023    Health Maintenance         Date Due Completion Date    Hepatitis C Screening Never done ---    COVID-19 Vaccine (1) Never done ---    TETANUS VACCINE Never done ---    Colorectal Cancer Screening Never done ---    Shingles Vaccine (1 of 2) Never done ---    Lipid Panel 08/10/2028 8/10/2023          Orders Placed This Encounter   Procedures    Hepatitis C Antibody       The following information is provided to all patients.  This information is to help you find resources for any of the problems found today that may be affecting your health:                Living healthy guide: www.[facility].[place].gov      Understanding Diabetes: www.diabetes.org      Eating healthy: www.cdc.gov/healthyweight      CDC home safety checklist: www.cdc.gov/steadi/patient.html      Agency on Aging: www.goea.[place].[facility]      Alcoholics anonymous (AA): www.aa.org      Physical Activity: www.maynor.nih.gov/mz1hwak      Tobacco use: www.quitwithusla.org       *WALE Barajas FNP - appointment Tues -Nov. 21, 2023 at 11:20 am *

## 2023-10-26 ENCOUNTER — PATIENT OUTREACH (OUTPATIENT)
Dept: ADMINISTRATIVE | Facility: HOSPITAL | Age: 68
End: 2023-10-26

## 2023-10-26 ENCOUNTER — TELEPHONE (OUTPATIENT)
Dept: FAMILY MEDICINE | Facility: CLINIC | Age: 68
End: 2023-10-26
Payer: MEDICARE

## 2023-10-26 LAB — NONINV COLON CA DNA+OCC BLD SCRN STL QL: POSITIVE

## 2023-10-26 NOTE — TELEPHONE ENCOUNTER
----- Message from HOSSEIN Lambert sent at 10/25/2023  7:48 PM CDT -----  Hasn't heard from sleep lab yet??

## 2023-10-26 NOTE — PROGRESS NOTES
Call pt and review results.  Cologuard is positive.  He needs a diagnostic colonoscopy.  Does he have an established GI doctor or a preference of who he sees?  Let him know the sleep lab clinic will be working on referrals tomorrow that they only do this on Fridays.

## 2023-10-26 NOTE — PROGRESS NOTES
Population Health Chart Review & Patient Outreach Details    Outreach via Chart Review/Record Upload    Updates Requested / Reviewed:  [x]      Health Maintenance Topics Addressed and Outreach Outcomes / Actions Taken:           HbA1c & Other Labs [x]  External Records Uploaded (Abstracted August 2023 A1c from )       [x]  Will collect Microalbumin at next visit

## 2023-10-26 NOTE — ASSESSMENT & PLAN NOTE
Last A1c 7.9% 8/10/23 per previous PCP.  Unable to tolerate metformin.  On glimepiride, pioglitazone, & Jardiance.

## 2023-10-27 NOTE — PROGRESS NOTES
Patient notified of results.  No preference in GI doctor.  Notified sleep clinic should be calling cause they do referrals only on Fridays.

## 2023-10-30 DIAGNOSIS — R19.5 POSITIVE COLORECTAL CANCER SCREENING USING COLOGUARD TEST: Primary | ICD-10-CM

## 2023-11-09 DIAGNOSIS — Z71.89 COMPLEX CARE COORDINATION: ICD-10-CM

## 2023-11-21 ENCOUNTER — OFFICE VISIT (OUTPATIENT)
Dept: FAMILY MEDICINE | Facility: CLINIC | Age: 68
End: 2023-11-21
Payer: MEDICARE

## 2023-11-21 VITALS
DIASTOLIC BLOOD PRESSURE: 80 MMHG | OXYGEN SATURATION: 96 % | BODY MASS INDEX: 33.1 KG/M2 | WEIGHT: 218.38 LBS | SYSTOLIC BLOOD PRESSURE: 139 MMHG | RESPIRATION RATE: 20 BRPM | HEIGHT: 68 IN | HEART RATE: 79 BPM | TEMPERATURE: 99 F

## 2023-11-21 DIAGNOSIS — R53.83 OTHER FATIGUE: Chronic | ICD-10-CM

## 2023-11-21 DIAGNOSIS — E11.65 TYPE 2 DIABETES MELLITUS WITH HYPERGLYCEMIA, WITHOUT LONG-TERM CURRENT USE OF INSULIN: Primary | Chronic | ICD-10-CM

## 2023-11-21 DIAGNOSIS — E61.1 IRON DEFICIENCY: ICD-10-CM

## 2023-11-21 DIAGNOSIS — M79.10 MYALGIA DUE TO STATIN: Chronic | ICD-10-CM

## 2023-11-21 DIAGNOSIS — T46.6X5A MYALGIA DUE TO STATIN: Chronic | ICD-10-CM

## 2023-11-21 DIAGNOSIS — Z12.5 PROSTATE CANCER SCREENING: ICD-10-CM

## 2023-11-21 LAB
ANION GAP SERPL CALCULATED.3IONS-SCNC: 16 MMOL/L (ref 7–16)
BUN SERPL-MCNC: 17 MG/DL (ref 7–18)
BUN/CREAT SERPL: 19 (ref 6–20)
CALCIUM SERPL-MCNC: 9.8 MG/DL (ref 8.5–10.1)
CHLORIDE SERPL-SCNC: 104 MMOL/L (ref 98–107)
CO2 SERPL-SCNC: 25 MMOL/L (ref 21–32)
CREAT SERPL-MCNC: 0.9 MG/DL (ref 0.7–1.3)
CREAT UR-MCNC: 32 MG/DL (ref 39–259)
EGFR (NO RACE VARIABLE) (RUSH/TITUS): 93 ML/MIN/1.73M2
EST. AVERAGE GLUCOSE BLD GHB EST-MCNC: 206 MG/DL
FERRITIN SERPL-MCNC: 56 NG/ML (ref 26–388)
GLUCOSE SERPL-MCNC: 190 MG/DL (ref 74–106)
HBA1C MFR BLD HPLC: 8.8 % (ref 4.5–6.6)
IRON SATN MFR SERPL: 24 % (ref 14–50)
IRON SERPL-MCNC: 105 ΜG/DL (ref 65–175)
MICROALBUMIN UR-MCNC: <0.5 MG/DL (ref 0–2.8)
MICROALBUMIN/CREAT RATIO PNL UR: <15.6 MG/G (ref 0–30)
POTASSIUM SERPL-SCNC: 4.5 MMOL/L (ref 3.5–5.1)
PSA SERPL-MCNC: 1.59 NG/ML
SODIUM SERPL-SCNC: 140 MMOL/L (ref 136–145)
TIBC SERPL-MCNC: 432 ΜG/DL (ref 250–450)

## 2023-11-21 PROCEDURE — 82728 FERRITIN: ICD-10-PCS | Mod: ,,, | Performed by: CLINICAL MEDICAL LABORATORY

## 2023-11-21 PROCEDURE — 82043 MICROALBUMIN / CREATININE RATIO URINE: ICD-10-PCS | Mod: ,,, | Performed by: CLINICAL MEDICAL LABORATORY

## 2023-11-21 PROCEDURE — 83540 IRON AND TIBC: ICD-10-PCS | Mod: ,,, | Performed by: CLINICAL MEDICAL LABORATORY

## 2023-11-21 PROCEDURE — 99214 PR OFFICE/OUTPT VISIT, EST, LEVL IV, 30-39 MIN: ICD-10-PCS | Mod: ,,, | Performed by: NURSE PRACTITIONER

## 2023-11-21 PROCEDURE — 82570 MICROALBUMIN / CREATININE RATIO URINE: ICD-10-PCS | Mod: ,,, | Performed by: CLINICAL MEDICAL LABORATORY

## 2023-11-21 PROCEDURE — 82570 ASSAY OF URINE CREATININE: CPT | Mod: ,,, | Performed by: CLINICAL MEDICAL LABORATORY

## 2023-11-21 PROCEDURE — G0103 PSA, SCREENING: ICD-10-PCS | Mod: ,,, | Performed by: CLINICAL MEDICAL LABORATORY

## 2023-11-21 PROCEDURE — 80048 BASIC METABOLIC PNL TOTAL CA: CPT | Mod: ,,, | Performed by: CLINICAL MEDICAL LABORATORY

## 2023-11-21 PROCEDURE — 82043 UR ALBUMIN QUANTITATIVE: CPT | Mod: ,,, | Performed by: CLINICAL MEDICAL LABORATORY

## 2023-11-21 PROCEDURE — 99214 OFFICE O/P EST MOD 30 MIN: CPT | Mod: ,,, | Performed by: NURSE PRACTITIONER

## 2023-11-21 PROCEDURE — 82728 ASSAY OF FERRITIN: CPT | Mod: ,,, | Performed by: CLINICAL MEDICAL LABORATORY

## 2023-11-21 PROCEDURE — G0103 PSA SCREENING: HCPCS | Mod: ,,, | Performed by: CLINICAL MEDICAL LABORATORY

## 2023-11-21 PROCEDURE — 83036 HEMOGLOBIN GLYCOSYLATED A1C: CPT | Mod: ,,, | Performed by: CLINICAL MEDICAL LABORATORY

## 2023-11-21 PROCEDURE — 83036 HEMOGLOBIN A1C: ICD-10-PCS | Mod: ,,, | Performed by: CLINICAL MEDICAL LABORATORY

## 2023-11-21 PROCEDURE — 83550 IRON AND TIBC: ICD-10-PCS | Mod: ,,, | Performed by: CLINICAL MEDICAL LABORATORY

## 2023-11-21 PROCEDURE — 80048 BASIC METABOLIC PANEL: ICD-10-PCS | Mod: ,,, | Performed by: CLINICAL MEDICAL LABORATORY

## 2023-11-21 PROCEDURE — 83550 IRON BINDING TEST: CPT | Mod: ,,, | Performed by: CLINICAL MEDICAL LABORATORY

## 2023-11-21 PROCEDURE — 83540 ASSAY OF IRON: CPT | Mod: ,,, | Performed by: CLINICAL MEDICAL LABORATORY

## 2023-11-21 NOTE — PROGRESS NOTES
[facility] - FAMILY MEDICINE       PATIENT NAME: Hung Turner   : 1955    AGE: 68 y.o. DATE OF ENCOUNTER: 23    MRN: [id number]      PCP: Jil Barajas FNP    Reason for Visit / Chief Complaint:  Follow-up (Patient presents to the clinic for a 6wk f/u t2dm htn and fatigue), Hypertension, and Diabetes (Eye exam would like appointment. )         274}    Subjective:     HPI:    Presents with his wife for f/u uncontrolled T2DM, HTN, & fatigue.    Not monitoring glucose lately.  Walking some; taking iron, fatigue improving - having more good days than bad..    Had positive cologuard & has GI appt scheduled    Review of Systems:   Review of Systems   Constitutional: Negative.    Respiratory:  Positive for shortness of breath (with exertion). Negative for cough and wheezing.    Cardiovascular: Negative.    Skin: Negative.    Neurological: Negative.        Allergies and Meds: 274}     Review of patient's allergies indicates:   Allergen Reactions    Codeine     Penicillins Rash     Note: - [employer]rees[employer] 2018    Rosuvastatin Rash     Note: - [employer]rees[employer] 2018        Current Outpatient Medications:     aspirin 325 MG tablet, Take 325 mg by mouth once daily., Disp: , Rfl:     cyanocobalamin, vitamin B-12, 2,500 mcg Tab, Take 1 tablet by mouth once daily., Disp: , Rfl:     ezetimibe (ZETIA) 10 mg tablet, Take 10 mg by mouth once daily., Disp: , Rfl:     ferrous sulfate (FEOSOL) 325 mg (65 mg iron) Tab tablet, Take 325 mg by mouth daily with breakfast., Disp: , Rfl:     glimepiride (AMARYL) 4 MG tablet, Take 4 mg by mouth 2 (two) times a day., Disp: , Rfl:     JARDIANCE 25 mg tablet, Take 25 mg by mouth once daily., Disp: , Rfl:     magnesium citrate 85 mg Chew, Take 2 capsules by mouth nightly., Disp: , Rfl:     niacin 500 MG CpSR, Take 250 mg by mouth every evening., Disp: , Rfl:     omega 3-dha-epa-fish oil (FISH OIL) 1,200 (144-216) mg Cap, Take 1 capsule by mouth 2 (two) times a  "day., Disp: , Rfl:     omeprazole (PRILOSEC) 20 MG capsule, Take 20 mg by mouth once daily., Disp: , Rfl:     pioglitazone (ACTOS) 45 MG tablet, Take 45 mg by mouth once daily., Disp: , Rfl:     Medical History: 274}     Past Medical History:   Diagnosis Date    Type 2 diabetes mellitus with hyperglycemia, without long-term current use of insulin 10/09/2023      Social History     Tobacco Use   Smoking Status Never    Passive exposure: Never   Smokeless Tobacco Never      Past Surgical History:   Procedure Laterality Date    CATHETERIZATION OF BOTH LEFT AND RIGHT HEART      COSMETIC SURGERY      bone cartilage removal from chest    HERNIA REPAIR      WISDOM TOOTH EXTRACTION          Health Maintenance: 274}     Health Maintenance         Date Due Completion Date    COVID-19 Vaccine (1) Never done ---    Eye Exam Never done ---    RSV Vaccine (Age 60+ and Pregnant patients) (1 - 1-dose 60+ series) Never done ---    Shingles Vaccine (2 of 2) 01/09/2024 11/14/2023    Hemoglobin A1c 02/21/2024 11/21/2023    Lipid Panel 08/10/2024 8/10/2023    PROSTATE-SPECIFIC ANTIGEN 11/21/2024 11/21/2023    Diabetes Urine Screening 11/21/2024 11/21/2023    Foot Exam 11/21/2024 11/21/2023    Colorectal Cancer Screening 10/19/2026 10/19/2023    TETANUS VACCINE 03/05/2030 3/5/2020          Immunization History   Administered Date(s) Administered    Influenza (FLUAD) - Quadrivalent - Adjuvanted - PF *Preferred* (65+) 11/08/2022, 10/09/2023    Pneumococcal Conjugate - 20 Valent 10/09/2023    Tdap 03/05/2020    Zoster Recombinant 11/14/2023     Objective:  274}   /80 (BP Location: Right arm, Patient Position: Sitting, BP Method: Large (Automatic))   Pulse 79   Temp 98.9 °F (37.2 °C) (Oral)   Resp 20   Ht 5' 8" (1.727 m)   Wt 99.1 kg (218 lb 6.4 oz)   SpO2 96%   BMI 33.21 kg/m²     Wt Readings from Last 3 Encounters:   11/21/23 99.1 kg (218 lb 6.4 oz)   10/25/23 101.2 kg (223 lb)   10/09/23 99.6 kg (219 lb 9.6 oz)     BP " Readings from Last 3 Encounters:   11/21/23 139/80   10/25/23 124/76   10/10/23 132/78     Body mass index is 33.21 kg/m².     Physical Exam  Vitals and nursing note reviewed.   Constitutional:       General: He is not in acute distress.     Appearance: Normal appearance.   HENT:      Head: Normocephalic.   Eyes:      Conjunctiva/sclera: Conjunctivae normal.      Pupils: Pupils are equal, round, and reactive to light.   Neck:      Thyroid: No thyromegaly.      Vascular: No carotid bruit.      Trachea: Trachea normal.   Cardiovascular:      Rate and Rhythm: Normal rate and regular rhythm.      Pulses:           Dorsalis pedis pulses are 3+ on the right side and 3+ on the left side.        Posterior tibial pulses are 3+ on the right side and 3+ on the left side.      Heart sounds: Normal heart sounds.   Pulmonary:      Effort: Pulmonary effort is normal. No respiratory distress.      Breath sounds: Normal breath sounds.   Musculoskeletal:      Cervical back: Neck supple.      Right lower leg: No edema.      Left lower leg: No edema.      Right foot: Normal range of motion. No deformity or bunion.      Left foot: Normal range of motion. No deformity or bunion.   Feet:      Right foot:      Protective Sensation: 6 sites tested.  6 sites sensed.      Skin integrity: Skin integrity normal. No ulcer, blister, erythema, warmth or callus.      Toenail Condition: Right toenails are abnormally thick and long.      Left foot:      Protective Sensation: 6 sites tested.  6 sites sensed.      Skin integrity: Skin integrity normal. No ulcer, blister, erythema, warmth or callus.      Toenail Condition: Left toenails are abnormally thick and long.   Lymphadenopathy:      Cervical: No cervical adenopathy.      Upper Body:      Right upper body: No supraclavicular adenopathy.      Left upper body: No supraclavicular adenopathy.   Skin:     General: Skin is warm and dry.      Findings: No rash.   Neurological:      General: No focal  deficit present.      Mental Status: He is alert and oriented to person, place, and time.   Psychiatric:         Mood and Affect: Mood normal.         Behavior: Behavior normal.          Assessment and Plan: 274}     1. Type 2 diabetes mellitus with hyperglycemia, without long-term current use of insulin  Assessment & Plan:  Last A1c 7.9% 8/10/23 per former PCP.   Not monitoring glucose at home lately.  Currently taking jardiance 25 mg dly, glimepiride 4 mg 2x/day, and pioglitazone 45 mg dly.  Check A1c today.   Urine for MA/creat ratio today.  Advised to update diabetic eye exam. Referral entered to schedule appt at [facility].    Orders:  -     Ambulatory referral/consult to Optometry; Future; Expected date: 11/28/2023  -     Hemoglobin A1C; Future; Expected date: 11/21/2023  -     Basic Metabolic Panel; Future; Expected date: 11/21/2023  -     Microalbumin/Creatinine Ratio, Urine; Future; Expected date: 11/21/2023    2. Prostate cancer screening  -     PSA, Screening; Future; Expected date: 11/21/2023    3. Iron deficiency  Comments:  F/u labs.  Continue iron.  Orders:  -     Iron and TIBC; Future; Expected date: 11/21/2023  -     Ferritin; Future; Expected date: 11/21/2023    4. Myalgia due to statin  Overview:  Unable to take statins.      5. Other fatigue  Comments:  Improving.  Continue iron.  Keep sleep study appt as scheduled to plan for JUVE eval.       Return to clinic 3 mth f/u uncontrolled T2DM; and sooner as needed.    Future Appointments   Date Time Provider Department Center   12/20/2023  9:00 AM Diana Colindres FNP [facility]   1/2/2024  2:45 PM Luba Baird MD [facility]   3/4/2024  1:20 PM Jil Barajas FNP [facility] DONNIE [facility]   10/30/2024 10:00 AM AWV NURSE, [facility] FAMILY MEDICINE [facility] DONNIE Melton        Signature:  HOSSEIN Lambert

## 2023-11-26 PROBLEM — E61.1 IRON DEFICIENCY: Status: ACTIVE | Noted: 2023-11-26

## 2023-11-26 NOTE — ASSESSMENT & PLAN NOTE
Last A1c 7.9% 8/10/23 per former PCP.   Not monitoring glucose at home lately.  Currently taking jardiance 25 mg dly, glimepiride 4 mg 2x/day, and pioglitazone 45 mg dly.  Check A1c today.   Urine for MA/creat ratio today.  Advised to update diabetic eye exam. Referral entered to schedule appt at [facility].

## 2023-11-28 NOTE — PROGRESS NOTES
Call pt and review results.  A1c is too high and has increased from 7.9-8.8% with a glucose of 190.  With the meds he is currently on, our treatment options are: 1. see if we can get a weekly SC GLP-1 covered such as Ozempic, Mounjaro, or Trulicity, or daily oral Rybelsus. 2. Add basal insulin.  Would he like to see the diabetic educator to see if there are things he can modify in his diet to help improve his levels?  Iron/ferritin improved, continue iron daily.  PSA normal.

## 2023-11-28 NOTE — PROGRESS NOTES
Patient notified of results. Patient states he has seen a diabetic educator in the past.  Would like to discuss options with wife

## 2023-12-04 LAB
LEFT EYE DM RETINOPATHY: NEGATIVE
RIGHT EYE DM RETINOPATHY: NEGATIVE

## 2023-12-06 DIAGNOSIS — E11.65 TYPE 2 DIABETES MELLITUS WITH HYPERGLYCEMIA, WITHOUT LONG-TERM CURRENT USE OF INSULIN: Primary | Chronic | ICD-10-CM

## 2023-12-06 RX ORDER — SEMAGLUTIDE 0.68 MG/ML
0.5 INJECTION, SOLUTION SUBCUTANEOUS
Qty: 9 ML | Refills: 1 | Status: SHIPPED | OUTPATIENT
Start: 2023-12-06 | End: 2024-01-23 | Stop reason: DRUGHIGH

## 2023-12-06 NOTE — PROGRESS NOTES
Okay I will send a rx so we can see if it is covered, but let's give him a sample to make sure he can tolerate it 1st.    Caution him that he will have to decrease portions because if he eats too much it will cause severe nausea.  Nausea is the most common side effect but it tends to subside over the 1st few weeks; it can also cause constipation.  It is best to eat very small meals more frequently and make sure to drink plenty of water.  Ozempic 0.25 mg weekly x 4 wks then increase to 0.5 mg weekly.

## 2023-12-07 ENCOUNTER — PATIENT OUTREACH (OUTPATIENT)
Dept: ADMINISTRATIVE | Facility: HOSPITAL | Age: 68
End: 2023-12-07

## 2023-12-07 NOTE — PROGRESS NOTES
Population Health Chart Review & Patient Outreach Details    Updates Requested / Reviewed:  [x]  Care Team Updated    Health Maintenance Topics Addressed and Outreach Outcomes / Actions Taken:          Diabetic Eye Exam [x] HM Updated with December 2023 Eye Exam (Dr. Alvarez). History Updated.

## 2023-12-18 DIAGNOSIS — G47.30 SLEEP APNEA, UNSPECIFIED: Primary | ICD-10-CM

## 2023-12-20 ENCOUNTER — OFFICE VISIT (OUTPATIENT)
Dept: GASTROENTEROLOGY | Facility: CLINIC | Age: 68
End: 2023-12-20
Payer: MEDICARE

## 2023-12-20 VITALS
HEIGHT: 68 IN | DIASTOLIC BLOOD PRESSURE: 73 MMHG | SYSTOLIC BLOOD PRESSURE: 128 MMHG | WEIGHT: 214.38 LBS | HEART RATE: 81 BPM | BODY MASS INDEX: 32.49 KG/M2

## 2023-12-20 DIAGNOSIS — R19.5 POSITIVE COLORECTAL CANCER SCREENING USING COLOGUARD TEST: ICD-10-CM

## 2023-12-20 PROCEDURE — 99214 OFFICE O/P EST MOD 30 MIN: CPT | Mod: PBBFAC | Performed by: NURSE PRACTITIONER

## 2023-12-20 PROCEDURE — 99214 OFFICE O/P EST MOD 30 MIN: CPT | Mod: S$PBB,,, | Performed by: NURSE PRACTITIONER

## 2023-12-20 PROCEDURE — 99214 PR OFFICE/OUTPT VISIT, EST, LEVL IV, 30-39 MIN: ICD-10-PCS | Mod: S$PBB,,, | Performed by: NURSE PRACTITIONER

## 2023-12-20 NOTE — PROGRESS NOTES
Hung Turner is a 68 y.o. male here for No chief complaint on file.        PCP: Jil Barajas  Referring Provider: Jil Barajas, [facility]  [address]  [facility]  [place] [address]     HPI:    Presents in referral due to positive Cologuard.  Patient reports that he has not ever had a colonoscopy.  No family history of colon cancer that he is aware of.  No history of colon polyps.  He has never had a colonoscopy screening.  He denies any GI complaints today.  No weight loss, no hematochezia or melena. No anticoagulation.  Denies any comorbid conditions with the exception of diabetes.          ROS:  Review of Systems   Constitutional:  Negative for activity change, appetite change, fatigue, fever and unexpected weight change.   HENT:  Negative for trouble swallowing.    Gastrointestinal:  Negative for abdominal distention, abdominal pain, blood in stool, change in bowel habit, constipation, diarrhea, nausea, vomiting, reflux and fecal incontinence.   Musculoskeletal:  Negative for gait problem.   Integumentary:  Negative for color change.   Psychiatric/Behavioral:  Negative for sleep disturbance. The patient is not nervous/anxious.           PMHX:  has a past medical history of Diabetic eye exam (12/04/2023) and Type 2 diabetes mellitus with hyperglycemia, without long-term current use of insulin (10/09/2023).    PSHX:  has a past surgical history that includes Cosmetic surgery; Hernia repair; [place] tooth extraction; and Catheterization of both left and right heart.    PFHX: family history includes Cancer in his brother; Diabetes in his mother; Heart attack in his father; No Known Problems in his brother.    PSlHX:  reports that he has never smoked. He has never been exposed to tobacco smoke. He has never used smokeless tobacco. He reports that he does not drink alcohol and does not use drugs.        Review of patient's allergies indicates:   Allergen Reactions    Codeine      "Penicillins Rash     Note: - Phreesia 01/08/2018    Rosuvastatin Rash     Note: - Phreesia 01/08/2018       Medication List with Changes/Refills   Current Medications    ASPIRIN 325 MG TABLET    Take 325 mg by mouth once daily.    CYANOCOBALAMIN, VITAMIN B-12, 2,500 MCG TAB    Take 1 tablet by mouth once daily.    EZETIMIBE (ZETIA) 10 MG TABLET    Take 10 mg by mouth once daily.    FERROUS SULFATE (FEOSOL) 325 MG (65 MG IRON) TAB TABLET    Take 325 mg by mouth daily with breakfast.    GLIMEPIRIDE (AMARYL) 4 MG TABLET    Take 4 mg by mouth 2 (two) times a day.    JARDIANCE 25 MG TABLET    Take 25 mg by mouth once daily.    MAGNESIUM CITRATE 85 MG CHEW    Take 2 capsules by mouth nightly.    NIACIN 500 MG CPSR    Take 250 mg by mouth every evening.    OMEGA 3-DHA-EPA-FISH OIL (FISH OIL) 1,200 (144-216) MG CAP    Take 1 capsule by mouth 2 (two) times a day.    OMEPRAZOLE (PRILOSEC) 20 MG CAPSULE    Take 20 mg by mouth once daily.    PIOGLITAZONE (ACTOS) 45 MG TABLET    Take 45 mg by mouth once daily.    SEMAGLUTIDE (OZEMPIC) 0.25 MG OR 0.5 MG (2 MG/3 ML) PEN INJECTOR    Inject 0.5 mg into the skin every 7 days. Start with 0.25 mg weekly x 4 weeks then 0.5 mg weekly        Objective Findings:  Vital Signs:  /73   Pulse 81   Ht 5' 8" (1.727 m)   Wt 97.3 kg (214 lb 6.4 oz)   BMI 32.60 kg/m²  Body mass index is 32.6 kg/m².    Physical Exam:  Physical Exam  Vitals and nursing note reviewed.   Constitutional:       General: He is not in acute distress.     Appearance: Normal appearance.   HENT:      Mouth/Throat:      Mouth: Mucous membranes are moist.   Cardiovascular:      Rate and Rhythm: Normal rate.   Pulmonary:      Effort: Pulmonary effort is normal.      Breath sounds: No wheezing, rhonchi or rales.   Abdominal:      General: Bowel sounds are normal. There is no distension.      Palpations: Abdomen is soft. There is no mass.      Tenderness: There is no abdominal tenderness. There is no guarding.      " "Hernia: No hernia is present.   Skin:     General: Skin is warm and dry.      Coloration: Skin is not jaundiced or pale.   Neurological:      Mental Status: He is alert and oriented to person, place, and time.   Psychiatric:         Mood and Affect: Mood normal.          Labs:  No results found for: "WBC", "HGB", "HCT", "MCV", "RDW", "PLT", "GRAN", "LYMPH", "MONO", "EOS", "BASO"  Lab Results   Component Value Date     11/21/2023    K 4.5 11/21/2023     11/21/2023    CO2 25 11/21/2023     (H) 11/21/2023    BUN 17 11/21/2023    CREATININE 0.90 11/21/2023    CALCIUM 9.8 11/21/2023         Imaging: No results found.      Assessment:  Hung Turner is a 68 y.o. male here with:  1. Positive colorectal cancer screening using Cologuard test          Recommendations:  1.   Schedule colonoscopy for positive Cologuard  2.     Follow up if symptoms worsen or fail to improve.      Order summary:  Orders Placed This Encounter    Colonoscopy       Thank you for allowing me to participate in the care of Hung Turner.      CASSIA Hayes          "

## 2023-12-21 ENCOUNTER — PROCEDURE VISIT (OUTPATIENT)
Dept: SLEEP MEDICINE | Facility: HOSPITAL | Age: 68
End: 2023-12-21
Attending: INTERNAL MEDICINE
Payer: MEDICARE

## 2023-12-21 DIAGNOSIS — G47.30 SLEEP APNEA, UNSPECIFIED: ICD-10-CM

## 2023-12-21 PROCEDURE — G0399 HOME SLEEP TEST/TYPE 3 PORTA: HCPCS | Mod: PO

## 2024-01-22 ENCOUNTER — TELEPHONE (OUTPATIENT)
Dept: FAMILY MEDICINE | Facility: CLINIC | Age: 69
End: 2024-01-22
Payer: MEDICARE

## 2024-01-22 DIAGNOSIS — E11.65 TYPE 2 DIABETES MELLITUS WITH HYPERGLYCEMIA, WITHOUT LONG-TERM CURRENT USE OF INSULIN: Primary | Chronic | ICD-10-CM

## 2024-01-23 DIAGNOSIS — E11.65 TYPE 2 DIABETES MELLITUS WITH HYPERGLYCEMIA, WITHOUT LONG-TERM CURRENT USE OF INSULIN: Chronic | ICD-10-CM

## 2024-01-23 RX ORDER — SEMAGLUTIDE 1.34 MG/ML
1 INJECTION, SOLUTION SUBCUTANEOUS
Qty: 9 ML | Refills: 3 | Status: CANCELLED | OUTPATIENT
Start: 2024-01-23

## 2024-01-23 RX ORDER — SEMAGLUTIDE 1.34 MG/ML
1 INJECTION, SOLUTION SUBCUTANEOUS
Qty: 9 ML | Refills: 3 | Status: SHIPPED | OUTPATIENT
Start: 2024-01-23 | End: 2024-03-04 | Stop reason: SDUPTHER

## 2024-01-23 RX ORDER — SEMAGLUTIDE 1.34 MG/ML
1 INJECTION, SOLUTION SUBCUTANEOUS
Qty: 9 ML | Refills: 3 | Status: SHIPPED | OUTPATIENT
Start: 2024-01-23 | End: 2024-01-23 | Stop reason: SDUPTHER

## 2024-01-23 NOTE — TELEPHONE ENCOUNTER
My plan was to increase Ozempic to 1 mg weekly if he is tolerating the 0.5 mg okay.  Check with patient and see before I sent the rx.  Thanks!

## 2024-01-23 NOTE — TELEPHONE ENCOUNTER
----- Message from Jessica Leahy sent at 1/22/2024  5:07 PM CST -----  [phone]

## 2024-02-21 ENCOUNTER — ANESTHESIA EVENT (OUTPATIENT)
Dept: GASTROENTEROLOGY | Facility: HOSPITAL | Age: 69
End: 2024-02-21
Payer: MEDICARE

## 2024-02-21 ENCOUNTER — HOSPITAL ENCOUNTER (OUTPATIENT)
Dept: GASTROENTEROLOGY | Facility: HOSPITAL | Age: 69
Discharge: HOME | End: 2024-02-21
Attending: NURSE PRACTITIONER
Payer: MEDICARE

## 2024-02-21 ENCOUNTER — ANESTHESIA (OUTPATIENT)
Dept: GASTROENTEROLOGY | Facility: HOSPITAL | Age: 69
End: 2024-02-21
Payer: MEDICARE

## 2024-02-21 VITALS
DIASTOLIC BLOOD PRESSURE: 65 MMHG | RESPIRATION RATE: 20 BRPM | HEART RATE: 86 BPM | OXYGEN SATURATION: 95 % | SYSTOLIC BLOOD PRESSURE: 98 MMHG

## 2024-02-21 DIAGNOSIS — R19.5 POSITIVE COLORECTAL CANCER SCREENING USING COLOGUARD TEST: ICD-10-CM

## 2024-02-21 DIAGNOSIS — K57.30 DIVERTICULOSIS OF COLON: Primary | ICD-10-CM

## 2024-02-21 DIAGNOSIS — K63.5 POLYP OF TRANSVERSE COLON, UNSPECIFIED TYPE: ICD-10-CM

## 2024-02-21 LAB — GLUCOSE SERPL-MCNC: 151 MG/DL (ref 70–105)

## 2024-02-21 PROCEDURE — 63600175 PHARM REV CODE 636 W HCPCS: Performed by: NURSE ANESTHETIST, CERTIFIED REGISTERED

## 2024-02-21 PROCEDURE — 88305 TISSUE EXAM BY PATHOLOGIST: CPT | Mod: TC,SUR | Performed by: INTERNAL MEDICINE

## 2024-02-21 PROCEDURE — 37000008 HC ANESTHESIA 1ST 15 MINUTES

## 2024-02-21 PROCEDURE — 37000009 HC ANESTHESIA EA ADD 15 MINS

## 2024-02-21 PROCEDURE — 45380 COLONOSCOPY AND BIOPSY: CPT | Mod: PT,KX,, | Performed by: INTERNAL MEDICINE

## 2024-02-21 PROCEDURE — 82962 GLUCOSE BLOOD TEST: CPT

## 2024-02-21 PROCEDURE — 45380 COLONOSCOPY AND BIOPSY: CPT | Mod: PT,KX | Performed by: INTERNAL MEDICINE

## 2024-02-21 PROCEDURE — 88305 TISSUE EXAM BY PATHOLOGIST: CPT | Mod: 26,,, | Performed by: PATHOLOGY

## 2024-02-21 PROCEDURE — 27000284 HC CANNULA NASAL: Performed by: NURSE ANESTHETIST, CERTIFIED REGISTERED

## 2024-02-21 PROCEDURE — 25000003 PHARM REV CODE 250: Performed by: NURSE ANESTHETIST, CERTIFIED REGISTERED

## 2024-02-21 PROCEDURE — D9220A PRA ANESTHESIA: Mod: PT,,, | Performed by: NURSE ANESTHETIST, CERTIFIED REGISTERED

## 2024-02-21 PROCEDURE — 27201423 OPTIME MED/SURG SUP & DEVICES STERILE SUPPLY

## 2024-02-21 RX ORDER — PROPOFOL 10 MG/ML
VIAL (ML) INTRAVENOUS
Status: DISCONTINUED | OUTPATIENT
Start: 2024-02-21 | End: 2024-02-21

## 2024-02-21 RX ORDER — LIDOCAINE HYDROCHLORIDE 20 MG/ML
INJECTION, SOLUTION EPIDURAL; INFILTRATION; INTRACAUDAL; PERINEURAL
Status: DISCONTINUED | OUTPATIENT
Start: 2024-02-21 | End: 2024-02-21

## 2024-02-21 RX ORDER — SODIUM CHLORIDE 0.9 % (FLUSH) 0.9 %
10 SYRINGE (ML) INJECTION
Status: DISCONTINUED | OUTPATIENT
Start: 2024-02-21 | End: 2024-02-22 | Stop reason: HOSPADM

## 2024-02-21 RX ADMIN — LIDOCAINE HYDROCHLORIDE 50 MG: 20 INJECTION, SOLUTION INTRAVENOUS at 01:02

## 2024-02-21 RX ADMIN — PROPOFOL 40 MG: 10 INJECTION, EMULSION INTRAVENOUS at 01:02

## 2024-02-21 RX ADMIN — PROPOFOL 50 MG: 10 INJECTION, EMULSION INTRAVENOUS at 01:02

## 2024-02-21 RX ADMIN — PROPOFOL 30 MG: 10 INJECTION, EMULSION INTRAVENOUS at 01:02

## 2024-02-21 RX ADMIN — PROPOFOL 60 MG: 10 INJECTION, EMULSION INTRAVENOUS at 01:02

## 2024-02-21 RX ADMIN — SODIUM CHLORIDE: 9 INJECTION, SOLUTION INTRAVENOUS at 01:02

## 2024-02-21 NOTE — ANESTHESIA POSTPROCEDURE EVALUATION
Anesthesia Post Evaluation    Patient: Hung Turner    Procedure(s) Performed: * No procedures listed *    Final Anesthesia Type: general      Patient location during evaluation: PACU  Patient participation: Yes- Able to Participate  Level of consciousness: awake and alert and oriented  Post-procedure vital signs: reviewed and stable  Pain management: adequate  Airway patency: patent    PONV status at discharge: No PONV  Anesthetic complications: no      Cardiovascular status: blood pressure returned to baseline and hemodynamically stable  Respiratory status: unassisted  Hydration status: euvolemic  Follow-up not needed.  Comments: Refer to nursing note for pain/grady score upon discharge from recovery.              Vitals Value Taken Time   /63 02/21/24 1419   Temp  02/21/24 1438   Pulse 86 02/21/24 1420   Resp 17 02/21/24 1420   SpO2 95 % 02/21/24 1420   Vitals shown include unvalidated device data.      Event Time   Out of Recovery 14:26:12         Pain/Grady Score: Grady Score: 8 (2/21/2024  1:53 PM)

## 2024-02-21 NOTE — ANESTHESIA PREPROCEDURE EVALUATION
02/21/2024  Hung Turner is a 68 y.o., male.    Past Medical History:   Diagnosis Date    Diabetic eye exam 12/04/2023    Dr. Colten Alvarez, OD - [facility]    Type 2 diabetes mellitus with hyperglycemia, without long-term current use of insulin 10/09/2023       Past Surgical History:   Procedure Laterality Date    CATHETERIZATION OF BOTH LEFT AND RIGHT HEART      COSMETIC SURGERY      bone cartilage removal from chest    HERNIA REPAIR      WISDOM TOOTH EXTRACTION         Family History   Problem Relation Age of Onset    Diabetes Mother     Heart attack Father     Cancer Brother         lung, smoker    No Known Problems Brother        Social History     Socioeconomic History    Marital status:    Tobacco Use    Smoking status: Never     Passive exposure: Never    Smokeless tobacco: Never   Substance and Sexual Activity    Alcohol use: Never    Drug use: Never    Sexual activity: Not Currently     Partners: Female     Social Determinants of Health     Financial Resource Strain: Low Risk  (10/25/2023)    Overall Financial Resource Strain (CARDIA)     Difficulty of Paying Living Expenses: Not very hard   Food Insecurity: No Food Insecurity (10/25/2023)    Hunger Vital Sign     Worried About Running Out of Food in the Last Year: Never true     Ran Out of Food in the Last Year: Never true   Transportation Needs: No Transportation Needs (10/25/2023)    PRAPARE - Transportation     Lack of Transportation (Medical): No     Lack of Transportation (Non-Medical): No   Physical Activity: Inactive (10/25/2023)    Exercise Vital Sign     Days of Exercise per Week: 0 days     Minutes of Exercise per Session: 0 min   Stress: No Stress Concern Present (10/25/2023)    [employer] - Occupational Stress Questionnaire     Feeling of Stress : Not at all   Social Connections:  Socially Integrated (10/25/2023)    Social Connection and Isolation Panel [NHANES]     Frequency of Communication with Friends and Family: More than three times a week     Frequency of Social Gatherings with Friends and Family: Once a week     Attends Sikhism Services: More than 4 times per year     Active Member of Clubs or Organizations: Yes     Attends Club or Organization Meetings: More than 4 times per year     Marital Status:    Housing Stability: Low Risk  (10/25/2023)    Housing Stability Vital Sign     Unable to Pay for Housing in the Last Year: No     Number of Places Lived in the Last Year: 1     Unstable Housing in the Last Year: No       Current Outpatient Medications   Medication Sig Dispense Refill    aspirin 325 MG tablet Take 325 mg by mouth once daily.      cyanocobalamin, vitamin B-12, 2,500 mcg Tab Take 1 tablet by mouth once daily.      ezetimibe (ZETIA) 10 mg tablet Take 10 mg by mouth once daily.      ferrous sulfate (FEOSOL) 325 mg (65 mg iron) Tab tablet Take 325 mg by mouth daily with breakfast.      glimepiride (AMARYL) 4 MG tablet Take 4 mg by mouth 2 (two) times a day.      JARDIANCE 25 mg tablet Take 25 mg by mouth once daily.      magnesium citrate 85 mg Chew Take 2 capsules by mouth nightly.      niacin 500 MG CpSR Take 250 mg by mouth every evening.      omega 3-dha-epa-fish oil (FISH OIL) 1,200 (144-216) mg Cap Take 1 capsule by mouth 2 (two) times a day.      omeprazole (PRILOSEC) 20 MG capsule Take 20 mg by mouth once daily.      pioglitazone (ACTOS) 45 MG tablet Take 45 mg by mouth once daily.      semaglutide (OZEMPIC) 1 mg/dose (4 mg/3 mL) Inject 1 mg into the skin every 7 days. 9 mL 3     No current facility-administered medications for this encounter.       Review of patient's allergies indicates:   Allergen Reactions    Codeine     Penicillins Rash     Note: - Phreesia 01/08/2018    Rosuvastatin Rash     Note: - [employer]reesia 01/08/2018          Pre-op Assessment    I  have reviewed the Patient Summary Reports.     I have reviewed the Nursing Notes. I have reviewed the NPO Status.   I have reviewed the Medications.     Review of Systems  Anesthesia Hx:  No problems with previous Anesthesia                Social:  Non-Smoker       Cardiovascular:     Hypertension           hyperlipidemia                       Hypertension         Hepatic/GI:     GERD             Endocrine:  Diabetes    Diabetes                    Obesity / BMI > 30         Anesthesia Plan  Type of Anesthesia, risks & benefits discussed:    Anesthesia Type: Gen Natural Airway  Intra-op Monitoring Plan: Standard ASA Monitors  Post Op Pain Control Plan: IV/PO Opioids PRN  Induction:  IV  Informed Consent: Informed consent signed with the Patient and all parties understand the risks and agree with anesthesia plan.  All questions answered. Patient consented to blood products? Yes  ASA Score: 3  Day of Surgery Review of History & Physical: H&P Update referred to the surgeon/provider.I have interviewed and examined the patient. I have reviewed the patient's H&P dated: There are no significant changes.     Ready For Surgery From Anesthesia Perspective.     .

## 2024-02-21 NOTE — TRANSFER OF CARE
Anesthesia Transfer of Care Note    Patient: Hung Turner    Procedure(s) Performed: * No procedures listed *    Patient location: PACU    Anesthesia Type: general    Transport from OR: Transported from OR on room air with adequate spontaneous ventilation    Post pain: adequate analgesia    Post assessment: no apparent anesthetic complications and tolerated procedure well    Post vital signs: stable    Level of consciousness: alert and responds to stimulation    Nausea/Vomiting: no nausea/vomiting    Complications: none    Transfer of care protocol was followed      Last vitals: Visit Vitals  BP 98/65   Pulse 87   Resp 20   SpO2 96%

## 2024-02-21 NOTE — DISCHARGE INSTRUCTIONS
Procedure Date  2/21/24     Impression  Overall Impression:   Polyp in the transverse colon was removed with cold forceps biopsy  Diverticulosis in the descending colon and sigmoid colon  Diverticula were noted as above. Two diminutive polyps were removed with biopsy from the transverse colon.     Recommendation    Await pathology results     Repeat colonoscopy in 5 years      Discharge: disp: DC to home. Resume diet. No driving x 24 hours. Follow-up with PCP as scheduled.  Dx: colon diverticulosis, two polyps were removed during this exam.     Indication  Positive colorectal cancer screening using Cologuard test

## 2024-02-21 NOTE — H&P
[facility] Endoscopy  Gastroenterology  H&P    Patient Name: Hung Turner  MRN: [id number]  Admission Date: 2/21/2024  Code Status: No Order    Attending Provider: Diana Colindres FNP   Primary Care Physician: Jil Barajas FNP  Principal Problem:<principal problem not specified>    Subjective:     History of Present Illness: Pt presents for colonoscopy due to positive cologuard test. This is his first colonoscopy.    Past Medical History:   Diagnosis Date    Diabetic eye exam 12/04/2023    Dr. Colten Alvarez, OD - [facility]    PONV (postoperative nausea and vomiting)     Type 2 diabetes mellitus with hyperglycemia, without long-term current use of insulin 10/09/2023       Past Surgical History:   Procedure Laterality Date    CATHETERIZATION OF BOTH LEFT AND RIGHT HEART      COSMETIC SURGERY      bone cartilage removal from chest    HERNIA REPAIR      WISDOM TOOTH EXTRACTION         Review of patient's allergies indicates:   Allergen Reactions    Codeine     Penicillins Rash     Note: - Phreesia 01/08/2018    Rosuvastatin Rash     Note: - Phreesia 01/08/2018     Family History       Problem Relation (Age of Onset)    Cancer Brother    Diabetes Mother    Heart attack Father    No Known Problems Brother          Tobacco Use    Smoking status: Never     Passive exposure: Never    Smokeless tobacco: Never   Substance and Sexual Activity    Alcohol use: Never    Drug use: Never    Sexual activity: Not Currently     Partners: Female     Review of Systems   Respiratory: Negative.     Cardiovascular: Negative.    Gastrointestinal: Negative.      Objective:     Vital Signs (Most Recent):  Pulse: 95 (02/21/24 1233)  Resp: 16 (02/21/24 1233)  BP: 118/73 (02/21/24 1233)  SpO2: 96 % (02/21/24 1233) Vital Signs (24h Range):  Pulse:  [95] 95  Resp:  [16] 16  SpO2:  [96 %] 96 %  BP: (118)/(73) 118/73        There is no height or weight on file to calculate BMI.    No intake or output data in the 24  "hours ending 02/21/24 1313    Lines/Drains/Airways       Peripheral Intravenous Line  Duration                  Peripheral IV - Single Lumen 02/21/24 1233 22 G Left;Posterior Hand <1 day                    Physical Exam  Vitals reviewed.   Constitutional:       General: He is not in acute distress.     Appearance: Normal appearance. He is well-developed. He is not ill-appearing.   HENT:      Head: Normocephalic and atraumatic.      Nose: Nose normal.   Eyes:      Pupils: Pupils are equal, round, and reactive to light.   Cardiovascular:      Rate and Rhythm: Normal rate and regular rhythm.   Pulmonary:      Effort: Pulmonary effort is normal.      Breath sounds: Normal breath sounds. No wheezing.   Abdominal:      General: Abdomen is flat. Bowel sounds are normal. There is no distension.      Palpations: Abdomen is soft.      Tenderness: There is no abdominal tenderness. There is no guarding.   Skin:     General: Skin is warm and dry.      Coloration: Skin is not jaundiced.   Neurological:      Mental Status: He is alert.   Psychiatric:         Attention and Perception: Attention normal.         Mood and Affect: Affect normal.         Speech: Speech normal.         Behavior: Behavior is cooperative.      Comments: Pt was calm while speaking.         Significant Labs:  CBC: No results for input(s): "WBC", "HGB", "HCT", "PLT" in the last 48 hours.  CMP: No results for input(s): "GLU", "CALCIUM", "ALBUMIN", "PROT", "NA", "K", "CO2", "CL", "BUN", "CREATININE", "ALKPHOS", "ALT", "AST", "BILITOT" in the last 48 hours.    Significant Imaging:  Imaging results within the past 24 hours have been reviewed.    Assessment/Plan:     There are no hospital problems to display for this patient.        Imp: screening for colon cancer; positive cologuard test  Plan: colonoscopy    Saurabh Stearns MD  Gastroenterology  [facility] End[facility]  "

## 2024-02-22 LAB
ESTROGEN SERPL-MCNC: NORMAL PG/ML
INSULIN SERPL-ACNC: NORMAL U[IU]/ML
LAB AP GROSS DESCRIPTION: NORMAL
LAB AP LABORATORY NOTES: NORMAL
T3RU NFR SERPL: NORMAL %

## 2024-02-23 ENCOUNTER — TELEPHONE (OUTPATIENT)
Dept: GASTROENTEROLOGY | Facility: CLINIC | Age: 69
End: 2024-02-23
Payer: MEDICARE

## 2024-02-23 NOTE — TELEPHONE ENCOUNTER
Attempted to call results. Left message.          ----- Message from Saurabh Stearns MD sent at 2/22/2024  6:05 PM CST -----  Colon polyps were tubular adenomas.  Recommend: repeat colonoscopy in 5 years.

## 2024-02-23 NOTE — TELEPHONE ENCOUNTER
Results and recommendations called to patient. Verbalized understanding.              ----- Message from Saurabh Stearns MD sent at 2/22/2024  6:05 PM CST -----  Colon polyps were tubular adenomas.  Recommend: repeat colonoscopy in 5 years.

## 2024-03-04 ENCOUNTER — OFFICE VISIT (OUTPATIENT)
Dept: FAMILY MEDICINE | Facility: CLINIC | Age: 69
End: 2024-03-04
Payer: MEDICARE

## 2024-03-04 VITALS
WEIGHT: 209 LBS | BODY MASS INDEX: 31.67 KG/M2 | DIASTOLIC BLOOD PRESSURE: 75 MMHG | HEIGHT: 68 IN | RESPIRATION RATE: 20 BRPM | TEMPERATURE: 98 F | OXYGEN SATURATION: 96 % | HEART RATE: 80 BPM | SYSTOLIC BLOOD PRESSURE: 123 MMHG

## 2024-03-04 DIAGNOSIS — I10 ESSENTIAL HYPERTENSION: Chronic | ICD-10-CM

## 2024-03-04 DIAGNOSIS — E11.65 TYPE 2 DIABETES MELLITUS WITH HYPERGLYCEMIA, WITHOUT LONG-TERM CURRENT USE OF INSULIN: Primary | Chronic | ICD-10-CM

## 2024-03-04 LAB
ANION GAP SERPL CALCULATED.3IONS-SCNC: 14 MMOL/L (ref 7–16)
BUN SERPL-MCNC: 15 MG/DL (ref 7–18)
BUN/CREAT SERPL: 17 (ref 6–20)
CALCIUM SERPL-MCNC: 9.6 MG/DL (ref 8.5–10.1)
CHLORIDE SERPL-SCNC: 105 MMOL/L (ref 98–107)
CO2 SERPL-SCNC: 24 MMOL/L (ref 21–32)
CREAT SERPL-MCNC: 0.9 MG/DL (ref 0.7–1.3)
EGFR (NO RACE VARIABLE) (RUSH/TITUS): 93 ML/MIN/1.73M2
EST. AVERAGE GLUCOSE BLD GHB EST-MCNC: 174 MG/DL
GLUCOSE SERPL-MCNC: 203 MG/DL (ref 74–106)
HBA1C MFR BLD HPLC: 7.7 % (ref 4.5–6.6)
POTASSIUM SERPL-SCNC: 4.9 MMOL/L (ref 3.5–5.1)
SODIUM SERPL-SCNC: 138 MMOL/L (ref 136–145)

## 2024-03-04 PROCEDURE — 83036 HEMOGLOBIN GLYCOSYLATED A1C: CPT | Mod: ,,, | Performed by: CLINICAL MEDICAL LABORATORY

## 2024-03-04 PROCEDURE — 99214 OFFICE O/P EST MOD 30 MIN: CPT | Mod: ,,, | Performed by: NURSE PRACTITIONER

## 2024-03-04 PROCEDURE — 80048 BASIC METABOLIC PNL TOTAL CA: CPT | Mod: ,,, | Performed by: CLINICAL MEDICAL LABORATORY

## 2024-03-04 RX ORDER — SEMAGLUTIDE 1.34 MG/ML
1 INJECTION, SOLUTION SUBCUTANEOUS
Qty: 9 ML | Refills: 3 | Status: SHIPPED | OUTPATIENT
Start: 2024-03-04

## 2024-03-04 NOTE — PROGRESS NOTES
[facility] - FAMILY MEDICINE       PATIENT NAME: Hung Turner   : 1955    AGE: 68 y.o. DATE OF ENCOUNTER: 3/4/24    MRN: [id number]      PCP: Jil Barajas FNP    Reason for Visit / Chief Complaint:  Follow-up (Patient presents to the clinic for 3m f/u ) and Diabetes         274}    Subjective:     HPI:    Presents accompanied by his wife for 3-mth f/u uncontrolled T2DM since adding Ozempic along with Jardiance, glimepiride, and pioglitazone. No problems tolerating ozempic, just started 1 mg last week.    Checking FPG daily - 119 this am, 140s max.    CPAP working okay but still feels tired, not sure he is getting enough sleep, avg 6.5-7 hrs.  Sleep is often broken waking after 3-4 hours and staying awake for low awhile before going back to sleep.  Discussed sleep hygiene and can try melatonin gummies p.r.n.    Review of Systems:   Review of Systems   Constitutional: Negative.    Respiratory:  Positive for shortness of breath (with exertion). Negative for cough and wheezing.    Cardiovascular: Negative.    Gastrointestinal: Negative.    Skin: Negative.    Neurological: Negative.    Psychiatric/Behavioral: Negative.         Allergies and Meds: 274}     Review of patient's allergies indicates:   Allergen Reactions    Codeine     Penicillins Rash     Note: - Phreesia 2018    Rosuvastatin Rash     Note: - Phreesia 2018        Current Outpatient Medications:     aspirin 325 MG tablet, Take 325 mg by mouth once daily., Disp: , Rfl:     cyanocobalamin, vitamin B-12, 2,500 mcg Tab, Take 1 tablet by mouth once daily., Disp: , Rfl:     ezetimibe (ZETIA) 10 mg tablet, Take 10 mg by mouth once daily., Disp: , Rfl:     ferrous sulfate (FEOSOL) 325 mg (65 mg iron) Tab tablet, Take 325 mg by mouth daily with breakfast., Disp: , Rfl:     glimepiride (AMARYL) 4 MG tablet, Take 4 mg by mouth 2 (two) times a day., Disp: , Rfl:     JARDIANCE 25 mg tablet, Take 25 mg by mouth once daily.,  Disp: , Rfl:     magnesium citrate 85 mg Chew, Take 2 capsules by mouth nightly., Disp: , Rfl:     niacin 500 MG CpSR, Take 250 mg by mouth every evening., Disp: , Rfl:     omega 3-dha-epa-fish oil (FISH OIL) 1,200 (144-216) mg Cap, Take 1 capsule by mouth 2 (two) times a day., Disp: , Rfl:     omeprazole (PRILOSEC) 20 MG capsule, Take 20 mg by mouth once daily., Disp: , Rfl:     pioglitazone (ACTOS) 45 MG tablet, Take 45 mg by mouth once daily., Disp: , Rfl:     semaglutide (OZEMPIC) 1 mg/dose (4 mg/3 mL), Inject 1 mg into the skin every 7 days., Disp: 9 mL, Rfl: 3    Labs:274}   I have reviewed labs below:  Lab Results   Component Value Date     11/21/2023    K 4.5 11/21/2023     11/21/2023    CALCIUM 9.8 11/21/2023     (H) 11/21/2023    BUN 17 11/21/2023    CREATININE 0.90 11/21/2023    PSA 1.590 11/21/2023    HGBA1C 8.8 (H) 11/21/2023    MICROALBUR <0.5 11/21/2023       Medical History: 274}     Past Medical History:   Diagnosis Date    Diabetic eye exam 12/04/2023    Dr. Colten Alvarez, OD - [facility]    PONV (postoperative nausea and vomiting)     Type 2 diabetes mellitus with hyperglycemia, without long-term current use of insulin 10/09/2023      Social History     Tobacco Use   Smoking Status Never    Passive exposure: Never   Smokeless Tobacco Never      Past Surgical History:   Procedure Laterality Date    CATHETERIZATION OF BOTH LEFT AND RIGHT HEART      COSMETIC SURGERY      bone cartilage removal from chest    HERNIA REPAIR      WISDOM TOOTH EXTRACTION          Health Maintenance: 274}     Health Maintenance         Date Due Completion Date    COVID-19 Vaccine (1) Never done ---    RSV Vaccine (Age 60+ and Pregnant patients) (1 - 1-dose 60+ series) Never done ---    Shingles Vaccine (2 of 2) 01/09/2024 11/14/2023    Hemoglobin A1c 02/21/2024 11/21/2023    Lipid Panel 08/10/2024 8/10/2023    PROSTATE-SPECIFIC ANTIGEN 11/21/2024 11/21/2023    Diabetes Urine Screening  "11/21/2024 11/21/2023    Foot Exam 11/21/2024 11/21/2023    Eye Exam 12/04/2024 12/4/2023    Colorectal Cancer Screening 02/21/2029 2/21/2024    TETANUS VACCINE 03/05/2030 3/5/2020          Immunization History   Administered Date(s) Administered    Influenza (FLUAD) - Quadrivalent - Adjuvanted - PF *Preferred* (65+) 11/08/2022, 10/09/2023    Pneumococcal Conjugate - 20 Valent 10/09/2023    Tdap 03/05/2020    Zoster Recombinant 11/14/2023     Objective:  274}   /75 (BP Location: Right arm, Patient Position: Sitting, BP Method: Large (Automatic))   Pulse 80   Temp 98.3 °F (36.8 °C) (Oral)   Resp 20   Ht 5' 8" (1.727 m)   Wt 94.8 kg (209 lb)   SpO2 96%   BMI 31.78 kg/m²     Wt Readings from Last 3 Encounters:   03/04/24 94.8 kg (209 lb)   12/20/23 97.3 kg (214 lb 6.4 oz)   11/21/23 99.1 kg (218 lb 6.4 oz)     BP Readings from Last 3 Encounters:   03/04/24 123/75   02/21/24 98/65   12/20/23 128/73     Body mass index is 31.78 kg/m².     Physical Exam  Vitals and nursing note reviewed.   Constitutional:       General: He is not in acute distress.     Appearance: Normal appearance. He is not ill-appearing.   HENT:      Head: Normocephalic.   Eyes:      Conjunctiva/sclera: Conjunctivae normal.   Cardiovascular:      Rate and Rhythm: Normal rate and regular rhythm.      Heart sounds: Normal heart sounds.   Pulmonary:      Effort: Pulmonary effort is normal. No respiratory distress.      Breath sounds: Normal breath sounds. No wheezing, rhonchi or rales.   Musculoskeletal:      Right lower leg: No edema.      Left lower leg: No edema.   Skin:     General: Skin is warm and dry.      Coloration: Skin is not jaundiced or pale.   Neurological:      Mental Status: He is alert and oriented to person, place, and time.      Gait: Gait normal.   Psychiatric:         Mood and Affect: Mood normal.         Behavior: Behavior normal.         Thought Content: Thought content normal.         Judgment: Judgment normal.      "     Assessment and Plan: 274}     1. Type 2 diabetes mellitus with hyperglycemia, without long-term current use of insulin  Comments:  Not controlled, last A1c 8.8%.  Ozempic was added.    Recheck A1c today.  Orders:  -     Basic Metabolic Panel; Future; Expected date: 03/04/2024  -     Hemoglobin A1C; Future; Expected date: 03/04/2024  -     semaglutide (OZEMPIC) 1 mg/dose (4 mg/3 mL); Inject 1 mg into the skin every 7 days.  Dispense: 9 mL; Refill: 3    2. Essential hypertension  Comments:  Controlled, continue current treatment.    Encouraged continued weight loss.  Continue current treatment.  Advised if glucose begins running < 100 often, he is to let me know and I will decrease his pioglitazone dosage.    Return to clinic 4-mth f/u T2DM, fasting; and sooner as needed.  Sent message to Jessica to check on appointment because it is booked as a 3-mth nonfasting instead of a 4-mth fasting.    Future Appointments   Date Time Provider Department Center   3/12/2024  2:45 PM Luba Baird MD [facility] DERM [place]   6/11/2024  1:20 PM Jil Barajas FNP [facility] [place]   10/30/2024 10:00 AM AWV NURSE, [facility] MEDICINE [facility] [place]        Signature:  HOSSEIN Lambert

## 2024-03-05 NOTE — PROGRESS NOTES
Call pt and review results. Non-fasting glucose is a little high, but A1c is improving and should continue to improve since he just increased Ozempic to 1 mg last week.

## 2024-03-12 ENCOUNTER — OFFICE VISIT (OUTPATIENT)
Dept: DERMATOLOGY | Facility: CLINIC | Age: 69
End: 2024-03-12
Payer: MEDICARE

## 2024-03-12 DIAGNOSIS — L30.9 DERMATITIS, UNSPECIFIED: Primary | ICD-10-CM

## 2024-03-12 PROCEDURE — 99204 OFFICE O/P NEW MOD 45 MIN: CPT | Mod: ,,, | Performed by: STUDENT IN AN ORGANIZED HEALTH CARE EDUCATION/TRAINING PROGRAM

## 2024-03-12 RX ORDER — TRIAMCINOLONE ACETONIDE 1 MG/G
CREAM TOPICAL 2 TIMES DAILY
Qty: 454 G | Refills: 2 | Status: SHIPPED | OUTPATIENT
Start: 2024-03-12

## 2024-03-12 NOTE — PROGRESS NOTES
[facility]  Sven Baird MD    [address] [address]  [phone]    Fax: [phone]    Patient Name: Hung Turner  Medical Record Number: [id number]  PCP: Jil Barajas FNP  Age: 68 y.o. : 1955  Contact: [phone] (home)     CC: rash  History of Present Illness:     Hung Turner is a 68 y.o.  male with no history of skin cancer  who presents to clinic today for red pruritic rash on the right arm. This has been present for one year. Previous treatments includes Sarna lotion. 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     The patient has no other concerns today.    Review of Systems:     Unremarkable other than mentioned above.     Physical Exam:     General: Relaxed, oriented, alert    Skin examination of the scalp, face, neck, chest, back, abdomen, upper extremities and lower extremities were normal except for as listed below      Assessment and Plan:     1. Dermatitis Unspecified  - erythematous, eczematous patch on the right elbow   DDx: adult onset eczema vs ACD     Plan:  - TAC 0.1 % cream BID as needed.    Counseling  I counseled the patient regarding the following:  Skin care: Patient instructed to use gentle skin care including dove unscented soap, CeraVe moisturizing cream, and fragrance free laundry detergent.  Expectations: The patient understands that there is not a definitive diagnosis at this time. Further testing or empiric therapy may be necessary to diagnose and improve the condition.  Contact office if: The patient develops a fever, or rash dramatically worsens despite treatment.      Return to clinic in 8 weeks     AVS printed with patient instructions     Sven Baird MD   Mohs Surgery/Dermatologic Oncology  Dermatology

## 2024-03-19 ENCOUNTER — OFFICE VISIT (OUTPATIENT)
Dept: FAMILY MEDICINE | Facility: CLINIC | Age: 69
End: 2024-03-19
Payer: MEDICARE

## 2024-03-19 VITALS
DIASTOLIC BLOOD PRESSURE: 74 MMHG | HEART RATE: 73 BPM | BODY MASS INDEX: 31.37 KG/M2 | WEIGHT: 207 LBS | TEMPERATURE: 99 F | SYSTOLIC BLOOD PRESSURE: 115 MMHG | RESPIRATION RATE: 20 BRPM | OXYGEN SATURATION: 98 % | HEIGHT: 68 IN

## 2024-03-19 DIAGNOSIS — K21.9 GASTROESOPHAGEAL REFLUX DISEASE WITHOUT ESOPHAGITIS: Primary | Chronic | ICD-10-CM

## 2024-03-19 DIAGNOSIS — E11.65 TYPE 2 DIABETES MELLITUS WITH HYPERGLYCEMIA, WITHOUT LONG-TERM CURRENT USE OF INSULIN: Chronic | ICD-10-CM

## 2024-03-19 PROCEDURE — 99213 OFFICE O/P EST LOW 20 MIN: CPT | Mod: ,,, | Performed by: NURSE PRACTITIONER

## 2024-03-19 NOTE — PROGRESS NOTES
[facility] - FAMILY MEDICINE       PATIENT NAME: Hung Turner   : 1955    AGE: 68 y.o. DATE OF ENCOUNTER: 3/19/24    MRN: [id number]      PCP: Jil Barajas FNP    Reason for Visit / Chief Complaint:  Gastroesophageal Reflux (Patient presents to the clinic for acid reflux (bad))         274}    Subjective:     HPI:    Presents with his wife c/o reflux.    Reports after colonoscopy was belching a lot, resumed meds and began having bad reflux, then began belching and spitting up pieces of plastic.  Thinks it is Niacin ER capsule coating (filled at [employer], [employer]), stopped taking it 3 days ago, and has had no further reflux or belching up pieces.  Resuming omeprazole.  This is his 4th week of Ozempic 1 mg which is helping glucose - 100, 96.    Review of Systems:   Review of Systems   Constitutional: Negative.    Respiratory: Negative.     Cardiovascular: Negative.    Gastrointestinal:  Negative for abdominal pain, blood in stool, constipation, nausea and vomiting.   Neurological: Negative.        Allergies and Meds: 274}     Review of patient's allergies indicates:   Allergen Reactions    Codeine     Penicillins Rash     Note: - Phreesia 2018    Rosuvastatin Rash     Note: - Phreesia 2018        Current Outpatient Medications:     aspirin 325 MG tablet, Take 325 mg by mouth once daily., Disp: , Rfl:     cyanocobalamin, vitamin B-12, 2,500 mcg Tab, Take 1 tablet by mouth once daily., Disp: , Rfl:     ezetimibe (ZETIA) 10 mg tablet, Take 10 mg by mouth once daily., Disp: , Rfl:     ferrous sulfate (FEOSOL) 325 mg (65 mg iron) Tab tablet, Take 325 mg by mouth daily with breakfast., Disp: , Rfl:     glimepiride (AMARYL) 4 MG tablet, Take 4 mg by mouth 2 (two) times a day., Disp: , Rfl:     JARDIANCE 25 mg tablet, Take 25 mg by mouth once daily., Disp: , Rfl:     magnesium citrate 85 mg Chew, Take 2 capsules by mouth nightly., Disp: , Rfl:     niacin 500 MG CpSR, Take 250 mg  by mouth every evening., Disp: , Rfl:     omega 3-dha-epa-fish oil (FISH OIL) 1,200 (144-216) mg Cap, Take 1 capsule by mouth 2 (two) times a day., Disp: , Rfl:     omeprazole (PRILOSEC) 20 MG capsule, Take 20 mg by mouth once daily., Disp: , Rfl:     pioglitazone (ACTOS) 45 MG tablet, Take 45 mg by mouth once daily., Disp: , Rfl:     semaglutide (OZEMPIC) 1 mg/dose (4 mg/3 mL), Inject 1 mg into the skin every 7 days., Disp: 9 mL, Rfl: 3    triamcinolone acetonide 0.1% (KENALOG) 0.1 % cream, Apply topically 2 (two) times daily., Disp: 454 g, Rfl: 2    Labs:274}   I have reviewed labs below:  Lab Results   Component Value Date     03/04/2024    K 4.9 03/04/2024     03/04/2024    CALCIUM 9.6 03/04/2024     (H) 03/04/2024    BUN 15 03/04/2024    CREATININE 0.90 03/04/2024    PSA 1.590 11/21/2023    HGBA1C 7.7 (H) 03/04/2024    MICROALBUR <0.5 11/21/2023       Medical History: 274}     Past Medical History:   Diagnosis Date    Diabetic eye exam 12/04/2023    Dr. Colten Alvarez, OD - [facility]    PONV (postoperative nausea and vomiting)     Type 2 diabetes mellitus with hyperglycemia, without long-term current use of insulin 10/09/2023      Social History     Tobacco Use   Smoking Status Never    Passive exposure: Never   Smokeless Tobacco Never      Past Surgical History:   Procedure Laterality Date    CATHETERIZATION OF BOTH LEFT AND RIGHT HEART      COSMETIC SURGERY      bone cartilage removal from chest    HERNIA REPAIR      WISDOM TOOTH EXTRACTION          Health Maintenance: 274}     Health Maintenance         Date Due Completion Date    COVID-19 Vaccine (1) Never done ---    RSV Vaccine (Age 60+ and Pregnant patients) (1 - 1-dose 60+ series) Never done ---    Shingles Vaccine (2 of 2) 01/09/2024 11/14/2023    Hemoglobin A1c 06/04/2024 3/4/2024    Lipid Panel 08/10/2024 8/10/2023    PROSTATE-SPECIFIC ANTIGEN 11/21/2024 11/21/2023    Diabetes Urine Screening 11/21/2024 11/21/2023     "Foot Exam 11/21/2024 11/21/2023    Eye Exam 12/04/2024 12/4/2023    Colorectal Cancer Screening 02/21/2029 2/21/2024    TETANUS VACCINE 03/05/2030 3/5/2020          Immunization History   Administered Date(s) Administered    Influenza (FLUAD) - Quadrivalent - Adjuvanted - PF *Preferred* (65+) 11/08/2022, 10/09/2023    Pneumococcal Conjugate - 20 Valent 10/09/2023    Tdap 03/05/2020    Zoster Recombinant 11/14/2023     Objective:  274}   /74 (BP Location: Right arm, Patient Position: Sitting, BP Method: Large (Automatic))   Pulse 73   Temp 98.6 °F (37 °C) (Oral)   Resp 20   Ht 5' 8" (1.727 m)   Wt 93.9 kg (207 lb)   SpO2 98%   BMI 31.47 kg/m²     Wt Readings from Last 3 Encounters:   03/19/24 93.9 kg (207 lb)   03/04/24 94.8 kg (209 lb)   12/20/23 97.3 kg (214 lb 6.4 oz)     BP Readings from Last 3 Encounters:   03/19/24 115/74   03/04/24 123/75   02/21/24 98/65     Body mass index is 31.47 kg/m².     Physical Exam  Vitals and nursing note reviewed.   Constitutional:       General: He is not in acute distress.     Appearance: Normal appearance. He is not ill-appearing.   HENT:      Head: Normocephalic.   Eyes:      Conjunctiva/sclera: Conjunctivae normal.   Cardiovascular:      Rate and Rhythm: Normal rate and regular rhythm.      Heart sounds: Normal heart sounds.   Pulmonary:      Effort: Pulmonary effort is normal. No respiratory distress.      Breath sounds: Normal breath sounds. No wheezing, rhonchi or rales.   Abdominal:      Palpations: Abdomen is soft.      Tenderness: There is no abdominal tenderness.   Skin:     General: Skin is warm and dry.      Coloration: Skin is not jaundiced or pale.   Neurological:      Mental Status: He is alert and oriented to person, place, and time.      Gait: Gait normal.   Psychiatric:         Mood and Affect: Mood normal.         Behavior: Behavior normal.         Thought Content: Thought content normal.         Judgment: Judgment normal.          Assessment and " Plan: 274}     1. Gastroesophageal reflux disease without esophagitis  Comments:  improved since stopping niacin ER  continue omeprazole 20 mg daily    2. Type 2 diabetes mellitus with hyperglycemia, without long-term current use of insulin  Comments:  improving since increasing Ozempic to 1 mg weekly       Stop Niacin altogether and will check lipids in July as planned.    Return to clinic 7/3/24 as scheduled with fasting labs; and sooner as needed.    Future Appointments   Date Time Provider Department Center   4/25/2024  4:00 PM Luba Baird MD [facility]   7/3/2024 11:00 AM Jil Barajas FNP [facility] [place]   10/30/2024 10:00 AM AWV NURSE, [facility] MEDIC[facility]        Signature:  HOSSEIN Lambert

## 2024-03-26 DIAGNOSIS — K21.9 GASTROESOPHAGEAL REFLUX DISEASE WITHOUT ESOPHAGITIS: Primary | Chronic | ICD-10-CM

## 2024-03-26 DIAGNOSIS — E11.65 TYPE 2 DIABETES MELLITUS WITH HYPERGLYCEMIA, WITHOUT LONG-TERM CURRENT USE OF INSULIN: ICD-10-CM

## 2024-03-26 RX ORDER — PANTOPRAZOLE SODIUM 40 MG/1
40 TABLET, DELAYED RELEASE ORAL DAILY
Qty: 90 TABLET | Refills: 3 | Status: SHIPPED | OUTPATIENT
Start: 2024-03-26 | End: 2025-03-26

## 2024-03-26 RX ORDER — EMPAGLIFLOZIN 25 MG/1
25 TABLET, FILM COATED ORAL DAILY
Qty: 90 TABLET | Refills: 3 | Status: SHIPPED | OUTPATIENT
Start: 2024-03-26

## 2024-03-26 NOTE — TELEPHONE ENCOUNTER
Patient states he is still belching.  Quit taking the Niacin.  Quit taking the omeprazole on the 23rd states the capsule is not digesting.  Requesting medication that is not a capsule.

## 2024-03-26 NOTE — TELEPHONE ENCOUNTER
I changed it to Protonix/pantoprazole because I know that comes in a tablet and it is in the same family as omeprazole/Prilosec.

## 2024-04-25 ENCOUNTER — OFFICE VISIT (OUTPATIENT)
Dept: DERMATOLOGY | Facility: CLINIC | Age: 69
End: 2024-04-25
Payer: MEDICARE

## 2024-04-25 DIAGNOSIS — L20.9 ATOPIC DERMATITIS, UNSPECIFIED TYPE: Primary | ICD-10-CM

## 2024-04-25 PROCEDURE — 99213 OFFICE O/P EST LOW 20 MIN: CPT | Mod: ,,, | Performed by: STUDENT IN AN ORGANIZED HEALTH CARE EDUCATION/TRAINING PROGRAM

## 2024-04-25 NOTE — PROGRESS NOTES
[facility]  Sven Baird MD    [address], [place] [address]  [phone]    Fax: [phone]    Patient Name: Hung Turner  Medical Record Number: [id number]  PCP: Jil Barajas FNP  Age: 68 y.o. : 1955  Contact: [phone] (home)     History of Present Illness:     Hung Turner is a 68 y.o.  male here for follow up of dermatitis unspecified treated with TAC 0.1% cream that has improved.     The patient has no other concerns today.    Review of Systems:     Unremarkable other than mentioned above.     Physical Exam:     General: Relaxed, oriented, alert    Skin examination of the scalp, face, neck, chest, back, abdomen, upper extremities and lower extremities were normal except for as listed below      Assessment and Plan:     1. Atopic Dermatitis   - eczematous plaques and papules located on the arms with lichenification    Status: moderate     Plan:   Topical Steroid: TAC 0.1 % cream PRN    Counseling.    Skin care: Patient should bathe using lukewarm water with a mild cleanser and moisturize immediately after. Emollients should be applied at least 2-3 times daily. Avoid scented detergents or fabric softeners. Keep fingernails short. Avoid excessive hand washing.  Expectations: The patient is aware that eczema is chronic in nature and can improve with moisturizers and topical steroids and worsen with stress, scented soaps, detergents, scratching, dry skin, changes in weather and skin infections.    Contact office if: Eczema worsens or fails to improve despite several weeks of treatment; patient develops skin infections (such as: yellow honey colored crusts or cold sores).    Return to clinic in 1 year.     AVS printed with patient instructions     Sven Baird MD   Mohs Surgery/Dermatologic Oncology  Dermatology

## 2024-06-09 DIAGNOSIS — Z71.89 COMPLEX CARE COORDINATION: ICD-10-CM

## 2024-07-03 ENCOUNTER — OFFICE VISIT (OUTPATIENT)
Dept: FAMILY MEDICINE | Facility: CLINIC | Age: 69
End: 2024-07-03
Payer: MEDICARE

## 2024-07-03 VITALS
RESPIRATION RATE: 18 BRPM | BODY MASS INDEX: 32.31 KG/M2 | DIASTOLIC BLOOD PRESSURE: 79 MMHG | OXYGEN SATURATION: 96 % | SYSTOLIC BLOOD PRESSURE: 124 MMHG | HEART RATE: 78 BPM | HEIGHT: 68 IN | TEMPERATURE: 98 F | WEIGHT: 213.19 LBS

## 2024-07-03 DIAGNOSIS — E78.00 HYPERCHOLESTEREMIA: Chronic | ICD-10-CM

## 2024-07-03 DIAGNOSIS — E61.1 IRON DEFICIENCY: ICD-10-CM

## 2024-07-03 DIAGNOSIS — R53.83 OTHER FATIGUE: Chronic | ICD-10-CM

## 2024-07-03 DIAGNOSIS — I10 ESSENTIAL HYPERTENSION: Chronic | ICD-10-CM

## 2024-07-03 DIAGNOSIS — Z79.899 ENCOUNTER FOR LONG-TERM (CURRENT) USE OF OTHER MEDICATIONS: ICD-10-CM

## 2024-07-03 DIAGNOSIS — E11.65 TYPE 2 DIABETES MELLITUS WITH HYPERGLYCEMIA, WITHOUT LONG-TERM CURRENT USE OF INSULIN: Primary | Chronic | ICD-10-CM

## 2024-07-03 DIAGNOSIS — G47.33 OSA ON CPAP: ICD-10-CM

## 2024-07-03 DIAGNOSIS — E11.65 TYPE 2 DIABETES MELLITUS WITH HYPERGLYCEMIA, WITHOUT LONG-TERM CURRENT USE OF INSULIN: Primary | ICD-10-CM

## 2024-07-03 PROCEDURE — 99214 OFFICE O/P EST MOD 30 MIN: CPT | Mod: ,,, | Performed by: NURSE PRACTITIONER

## 2024-07-03 RX ORDER — PIOGLITAZONEHYDROCHLORIDE 45 MG/1
45 TABLET ORAL DAILY
Qty: 90 TABLET | Refills: 3 | Status: SHIPPED | OUTPATIENT
Start: 2024-07-03

## 2024-07-03 RX ORDER — MELATONIN 10 MG
1 CAPSULE ORAL NIGHTLY PRN
COMMUNITY

## 2024-07-03 RX ORDER — EZETIMIBE 10 MG/1
10 TABLET ORAL DAILY
Qty: 90 TABLET | Refills: 3 | Status: SHIPPED | OUTPATIENT
Start: 2024-07-03

## 2024-07-03 RX ORDER — GLIMEPIRIDE 4 MG/1
8 TABLET ORAL
Qty: 180 TABLET | Refills: 1 | Status: SHIPPED | OUTPATIENT
Start: 2024-07-03

## 2024-07-03 NOTE — ASSESSMENT & PLAN NOTE
Lab Results   Component Value Date    HGBA1C 7.7 (H) 03/04/2024     Taking glimepiride 4 mg daily (is on med list for 2x/day but states they only send 90 tabs). Sent glimepiride 2 daily but stay with 1 daily for now; however, if stays off Ozempic may need to increase.  Taking Jardiance 25 mg daily.  Taking pioglitazone 45 mg daily.  Slacked off Ozempic due to cost, last dose 6/24/24.  Had A1c this am with results pending.

## 2024-07-08 DIAGNOSIS — E03.9 HYPOTHYROIDISM (ACQUIRED): Primary | ICD-10-CM

## 2024-07-08 RX ORDER — LEVOTHYROXINE SODIUM 25 UG/1
25 TABLET ORAL
Qty: 90 TABLET | Refills: 0 | Status: SHIPPED | OUTPATIENT
Start: 2024-07-08 | End: 2025-07-08

## 2024-07-08 NOTE — ASSESSMENT & PLAN NOTE
Lab Results   Component Value Date    TSH 4.000 (H) 07/03/2024 07/08/2024 start levothyroxine 25 mcg daily and recheck TSH at next visit.

## 2024-09-09 DIAGNOSIS — E03.9 HYPOTHYROIDISM (ACQUIRED): Primary | ICD-10-CM

## 2024-09-09 DIAGNOSIS — E03.9 HYPOTHYROIDISM (ACQUIRED): Chronic | ICD-10-CM

## 2024-09-09 RX ORDER — LEVOTHYROXINE SODIUM 25 UG/1
25 TABLET ORAL
Qty: 90 TABLET | Refills: 3 | Status: SHIPPED | OUTPATIENT
Start: 2024-09-09 | End: 2025-09-09

## 2024-10-07 ENCOUNTER — OFFICE VISIT (OUTPATIENT)
Dept: FAMILY MEDICINE | Facility: CLINIC | Age: 69
End: 2024-10-07
Payer: MEDICARE

## 2024-10-07 VITALS
SYSTOLIC BLOOD PRESSURE: 124 MMHG | HEIGHT: 68 IN | OXYGEN SATURATION: 96 % | HEART RATE: 70 BPM | DIASTOLIC BLOOD PRESSURE: 79 MMHG | RESPIRATION RATE: 18 BRPM | BODY MASS INDEX: 33.25 KG/M2 | TEMPERATURE: 98 F | WEIGHT: 219.38 LBS

## 2024-10-07 DIAGNOSIS — E03.9 HYPOTHYROIDISM (ACQUIRED): Chronic | ICD-10-CM

## 2024-10-07 DIAGNOSIS — E11.65 TYPE 2 DIABETES MELLITUS WITH HYPERGLYCEMIA, WITHOUT LONG-TERM CURRENT USE OF INSULIN: Primary | Chronic | ICD-10-CM

## 2024-10-07 PROBLEM — R19.5 POSITIVE COLORECTAL CANCER SCREENING USING COLOGUARD TEST: Status: RESOLVED | Noted: 2023-10-30 | Resolved: 2024-10-07

## 2024-10-07 PROBLEM — E61.1 IRON DEFICIENCY: Status: RESOLVED | Noted: 2023-11-26 | Resolved: 2024-10-07

## 2024-10-07 LAB
ANION GAP SERPL CALCULATED.3IONS-SCNC: 13 MMOL/L (ref 7–16)
BUN SERPL-MCNC: 14 MG/DL (ref 7–18)
BUN/CREAT SERPL: 19 (ref 6–20)
CALCIUM SERPL-MCNC: 9 MG/DL (ref 8.5–10.1)
CHLORIDE SERPL-SCNC: 106 MMOL/L (ref 98–107)
CO2 SERPL-SCNC: 22 MMOL/L (ref 21–32)
CREAT SERPL-MCNC: 0.74 MG/DL (ref 0.7–1.3)
CREAT UR-MCNC: 122 MG/DL (ref 39–259)
EGFR (NO RACE VARIABLE) (RUSH/TITUS): 98 ML/MIN/1.73M2
EST. AVERAGE GLUCOSE BLD GHB EST-MCNC: 206 MG/DL
GLUCOSE SERPL-MCNC: 178 MG/DL (ref 74–106)
HBA1C MFR BLD HPLC: 8.8 % (ref 4.5–6.6)
MICROALBUMIN UR-MCNC: 1.9 MG/DL (ref 0–2.8)
MICROALBUMIN/CREAT RATIO PNL UR: 15.6 MG/G (ref 0–30)
POTASSIUM SERPL-SCNC: 4 MMOL/L (ref 3.5–5.1)
SODIUM SERPL-SCNC: 137 MMOL/L (ref 136–145)

## 2024-10-07 PROCEDURE — 83036 HEMOGLOBIN GLYCOSYLATED A1C: CPT | Mod: ,,, | Performed by: CLINICAL MEDICAL LABORATORY

## 2024-10-07 PROCEDURE — 80048 BASIC METABOLIC PNL TOTAL CA: CPT | Mod: ,,, | Performed by: CLINICAL MEDICAL LABORATORY

## 2024-10-07 PROCEDURE — 99214 OFFICE O/P EST MOD 30 MIN: CPT | Mod: ,,, | Performed by: NURSE PRACTITIONER

## 2024-10-07 PROCEDURE — 82043 UR ALBUMIN QUANTITATIVE: CPT | Mod: ,,, | Performed by: CLINICAL MEDICAL LABORATORY

## 2024-10-07 PROCEDURE — 82570 ASSAY OF URINE CREATININE: CPT | Mod: ,,, | Performed by: CLINICAL MEDICAL LABORATORY

## 2024-10-07 RX ORDER — LEVOTHYROXINE SODIUM 25 UG/1
25 TABLET ORAL
Qty: 90 TABLET | Refills: 3 | Status: SHIPPED | OUTPATIENT
Start: 2024-10-07 | End: 2025-10-07

## 2024-10-07 NOTE — ASSESSMENT & PLAN NOTE
Lab Results   Component Value Date    HGBA1C 6.4 07/03/2024     Only taking glimepiride 4 mg daily with breakfast rather than 2 tablets as prescribed since he is unable to get Jardiance or Ozempic due to cost.  Advised to increase to 2 tabs (8mg) daily with breakfast.    Continue pioglitazone 45 mg daily.    Is checking into patient assistance programs for Jardiance and Ozempic.  Not able to use optimal treatment plan due to cost of meds.    Recheck A1c today.     Latest Reference Range & Units 11/21/23 14:11 03/04/24 13:59 07/03/24 07:59   Hemoglobin A1C External 4.5 - 6.6 % 8.8 (H) 7.7 (H) 6.4   Estimated Avg Glucose mg/dL 206 174 137   Evidence above of how well the Jardiance and Ozempic were working to improve and control T2DM.

## 2024-10-07 NOTE — PROGRESS NOTES
[facility]  [address] [place] [address]  Phone: [phone]  Fax: [phone]       PATIENT NAME: Hung Turner   : 1955    AGE: 69 y.o. DATE OF ENCOUNTER: 10/7/24    MRN: [id number]      PCP: Jil Barajas FNP    Subjective:     Reason for Visit / Chief Complaint:     274}  Chief Complaint   Patient presents with    Diabetes     Patient reports to the clinic for 3 month follow up. He needs his levothyroxine sent to a different pharmacy due to not receiving it nearly a month ago. He would also like to check to see if he can get his Ozempic or Jardiance at a reduced cost.    Health Maintenance     Care gaps addressed, patient will have his Flu vaccine at a later time, eye exam due later this year.    Meera Lee CMA     Presents with wife for 3 mth f/u T2DM.  Glucose running high since not able to get jardiance or Ozempic due to cost.    209 188 186 173 185 170 210 189 161 185 210 180 198 250 248 94 188 159 121 135 119 168 153 160 147 138 176 159 154 138 197 150 141 132 137 149 100    Glucose was much better and felt better when he was taking Jardiance and Ozempic.    Never got levothyroxine from [facility] and wants to change to [facility].    Review of Systems:     Review of Systems   Constitutional:  Positive for fatigue and unexpected weight change. Negative for chills and fever.   Respiratory:  Positive for shortness of breath (with exertion). Negative for cough and wheezing.    Cardiovascular: Negative.    Gastrointestinal: Negative.    Skin: Negative.    Neurological: Negative.    Psychiatric/Behavioral: Negative.         Allergies and Meds: 274}     Review of patient's allergies indicates:   Allergen Reactions    Codeine     Penicillins Rash     Note: - [employer] 2018    Rosuvastatin Rash     Note: - [employer] 2018        Current Outpatient Medications   Medication Sig Dispense Refill    aspirin 325 MG tablet Take 325 mg  by mouth once daily.      cyanocobalamin, vitamin B-12, 2,500 mcg Tab Take 1 tablet by mouth once daily.      ezetimibe (ZETIA) 10 mg tablet Take 1 tablet (10 mg total) by mouth once daily. 90 tablet 3    ferrous sulfate (FEOSOL) 325 mg (65 mg iron) Tab tablet Take 325 mg by mouth daily with breakfast.      glimepiride (AMARYL) 4 MG tablet Take 2 tablets (8 mg total) by mouth daily with breakfast. 180 tablet 1    magnesium citrate 85 mg Chew Take 2 capsules by mouth nightly.      melatonin 10 mg Cap Take 1 capsule by mouth nightly as needed.      omega 3-dha-epa-fish oil (FISH OIL) 1,200 (144-216) mg Cap Take 1 capsule by mouth 2 (two) times a day.      pantoprazole (PROTONIX) 40 MG tablet Take 1 tablet (40 mg total) by mouth once daily. 90 tablet 3    pioglitazone (ACTOS) 45 MG tablet Take 1 tablet (45 mg total) by mouth once daily. 90 tablet 3    triamcinolone acetonide 0.1% (KENALOG) 0.1 % cream Apply topically 2 (two) times daily. 454 g 2    levothyroxine (SYNTHROID) 25 MCG tablet Take 1 tablet (25 mcg total) by mouth before breakfast. Take 1st thing every am w/ full glass water, wait at least 30 min for other meds or intake. 90 tablet 3     No current facility-administered medications for this visit.       Labs:274}   I have reviewed labs below:    Lab Results   Component Value Date    WBC 6.33 07/03/2024    RBC 5.31 07/03/2024    HGB 16.8 07/03/2024    HCT 51.2 07/03/2024     07/03/2024     07/03/2024    K 3.9 07/03/2024     (H) 07/03/2024    CALCIUM 9.1 07/03/2024     (H) 07/03/2024    BUN 21 (H) 07/03/2024    CREATININE 0.87 07/03/2024    ALT 33 07/03/2024    AST 20 07/03/2024    CHOL 213 (H) 07/03/2024    TRIG 209 (H) 07/03/2024    HDL 44 07/03/2024    LDLCALC 127 07/03/2024    TSH 1.960 09/09/2024    PSA 1.590 11/21/2023    HGBA1C 6.4 07/03/2024    MICROALBUR <0.5 11/21/2023       Medical History: 274}     Past Medical History:   Diagnosis Date    Diabetic eye exam 12/04/2023     "Dr. Colten Alvarez, OD - [facility]    Iron deficiency 11/26/2023    PONV (postoperative nausea and vomiting)     Positive colorectal cancer screening using Cologuard test 10/30/2023    Type 2 diabetes mellitus with hyperglycemia, without long-term current use of insulin 10/09/2023      Social History     Tobacco Use   Smoking Status Never    Passive exposure: Never   Smokeless Tobacco Never      Past Surgical History:   Procedure Laterality Date    CATHETERIZATION OF BOTH LEFT AND RIGHT HEART      COSMETIC SURGERY      bone cartilage removal from chest    HERNIA REPAIR      WISDOM TOOTH EXTRACTION          Health Maintenance:      Health Maintenance Topics with due status: Not Due       Topic Last Completion Date    TETANUS VACCINE 03/05/2020    PROSTATE-SPECIFIC ANTIGEN 11/21/2023    Diabetes Urine Screening 11/21/2023    Colorectal Cancer Screening 02/21/2024    Lipid Panel 07/03/2024    Hemoglobin A1c 07/03/2024       Objective:  274}   Vital Signs  Temp: 98 °F (36.7 °C)  Temp Source: Oral  Pulse: 70  Resp: 18  SpO2: 96 %  BP: 124/79  BP Location: Left arm  Patient Position: Sitting  Pain Score: 0-No pain  Height and Weight  Height: 5' 8" (172.7 cm)  Weight: 99.5 kg (219 lb 6.4 oz)  BSA (Calculated - sq m): 2.18 sq meters  BMI (Calculated): 33.4  Weight in (lb) to have BMI = 25: 164.1    Over the last two weeks how often have you been bothered by little interest or pleasure in doing things: 0  Over the last two weeks how often have you been bothered by feeling down, depressed or hopeless: 0  PHQ-2 Total Score: 0  PHQ-9 Score: 0  PHQ-9 Interpretation: Minimal or None    Wt Readings from Last 3 Encounters:   10/07/24 99.5 kg (219 lb 6.4 oz)   07/03/24 96.7 kg (213 lb 3.2 oz)   03/19/24 93.9 kg (207 lb)     Physical Exam  Vitals and nursing note reviewed.   Constitutional:       General: He is not in acute distress.     Appearance: Normal appearance. He is obese. He is not ill-appearing.   HENT:      " Head: Normocephalic.   Eyes:      Conjunctiva/sclera: Conjunctivae normal.   Neck:      Trachea: Trachea normal.   Cardiovascular:      Rate and Rhythm: Normal rate and regular rhythm.      Pulses: Normal pulses.      Heart sounds: Normal heart sounds.   Pulmonary:      Effort: Pulmonary effort is normal. No respiratory distress.      Breath sounds: Normal breath sounds. No wheezing, rhonchi or rales.   Musculoskeletal:      Cervical back: Neck supple.      Right lower leg: No edema.      Left lower leg: No edema.   Lymphadenopathy:      Cervical: No cervical adenopathy.   Skin:     General: Skin is warm and dry.      Coloration: Skin is not jaundiced or pale.   Neurological:      Mental Status: He is alert and oriented to person, place, and time.      Gait: Gait normal.   Psychiatric:         Mood and Affect: Mood normal.         Behavior: Behavior normal.          Assessment and Plan: 274}     1. Type 2 diabetes mellitus with hyperglycemia, without long-term current use of insulin  Assessment & Plan:  Lab Results   Component Value Date    HGBA1C 6.4 07/03/2024     Only taking glimepiride 4 mg daily with breakfast rather than 2 tablets as prescribed since he is unable to get Jardiance or Ozempic due to cost.  Advised to increase to 2 tabs (8mg) daily with breakfast.    Continue pioglitazone 45 mg daily.    Is checking into patient assistance programs for Jardiance and Ozempic.  Not able to use optimal treatment plan due to cost of meds.    Recheck A1c today.     Latest Reference Range & Units 11/21/23 14:11 03/04/24 13:59 07/03/24 07:59   Hemoglobin A1C External 4.5 - 6.6 % 8.8 (H) 7.7 (H) 6.4   Estimated Avg Glucose mg/dL 206 174 137   Evidence above of how well the Jardiance and Ozempic were working to improve and control T2DM.    Orders:  -     Microalbumin/Creatinine Ratio, Urine; Future; Expected date: 10/07/2024  -     Basic Metabolic Panel; Future; Expected date: 10/07/2024  -     Hemoglobin A1C; Future;  Expected date: 10/07/2024    2. Hypothyroidism (acquired)  Assessment & Plan:  Lab Results   Component Value Date    TSH 1.960 09/09/2024     Resend Rx for levothyroxine 25 mcg daily and continue.    Orders:  -     levothyroxine (SYNTHROID) 25 MCG tablet; Take 1 tablet (25 mcg total) by mouth before breakfast. Take 1st thing every am w/ full glass water, wait at least 30 min for other meds or intake.  Dispense: 90 tablet; Refill: 3      Wants to wait until AWV visit to get his flu shot.  Diagnosis, risks, benefits, and side effects of any meds and treatment plan were discussed with the patient.  All questions were answered to the satisfaction of the patient, and pt verbalized understanding and agreement to treatment plan.      Follow up for AWV as scheduled; 3 mth f/u uncontrolled T2DM, HLD with fasting labs.    Signature:  HOSSEIN Lambert-BC    Future Appointments   Date Time Provider Department Center   10/30/2024 10:00 AM AWV NURSE, [facility] DONNIE Melton   1/8/2025 10:20 AM Jil Barajas FNP [facility] [place]   4/25/2025 10:15 AM Luba Baird MD [facility]

## 2024-10-07 NOTE — ASSESSMENT & PLAN NOTE
Lab Results   Component Value Date    TSH 1.960 09/09/2024     Resend Rx for levothyroxine 25 mcg daily and continue.

## 2024-10-10 NOTE — PROGRESS NOTES
Call pt and review results.  A1c is terrible with significant increase since stopping Jardiance and Ozempic so I definitely hope he will be able to get 1 or both medications through a patient assistance program.  He is maxed out on glimepiride and pioglitazone.  I do not think he was able to tolerate metformin.  Our only other treatment option would be adding a basal insulin if he can not get Jardiance and/or Ozempic or other equivalent medication.

## 2024-10-30 ENCOUNTER — OFFICE VISIT (OUTPATIENT)
Dept: FAMILY MEDICINE | Facility: CLINIC | Age: 69
End: 2024-10-30
Payer: MEDICARE

## 2024-10-30 VITALS
DIASTOLIC BLOOD PRESSURE: 70 MMHG | HEIGHT: 68 IN | BODY MASS INDEX: 34.1 KG/M2 | WEIGHT: 225 LBS | OXYGEN SATURATION: 96 % | RESPIRATION RATE: 16 BRPM | TEMPERATURE: 98 F | SYSTOLIC BLOOD PRESSURE: 120 MMHG | HEART RATE: 68 BPM

## 2024-10-30 DIAGNOSIS — E11.65 TYPE 2 DIABETES MELLITUS WITH HYPERGLYCEMIA, WITHOUT LONG-TERM CURRENT USE OF INSULIN: Chronic | ICD-10-CM

## 2024-10-30 DIAGNOSIS — M79.10 MYALGIA DUE TO STATIN: Chronic | ICD-10-CM

## 2024-10-30 DIAGNOSIS — E11.69 DYSLIPIDEMIA DUE TO TYPE 2 DIABETES MELLITUS: Chronic | ICD-10-CM

## 2024-10-30 DIAGNOSIS — Z00.00 ENCOUNTER FOR SUBSEQUENT ANNUAL WELLNESS VISIT (AWV) IN MEDICARE PATIENT: Primary | ICD-10-CM

## 2024-10-30 DIAGNOSIS — T46.6X5A MYALGIA DUE TO STATIN: Chronic | ICD-10-CM

## 2024-10-30 DIAGNOSIS — Z23 NEED FOR VACCINATION: ICD-10-CM

## 2024-10-30 DIAGNOSIS — E78.5 DYSLIPIDEMIA DUE TO TYPE 2 DIABETES MELLITUS: Chronic | ICD-10-CM

## 2024-10-30 DIAGNOSIS — E66.09 CLASS 1 OBESITY DUE TO EXCESS CALORIES WITH SERIOUS COMORBIDITY AND BODY MASS INDEX (BMI) OF 34.0 TO 34.9 IN ADULT: Chronic | ICD-10-CM

## 2024-10-30 DIAGNOSIS — E66.811 CLASS 1 OBESITY DUE TO EXCESS CALORIES WITH SERIOUS COMORBIDITY AND BODY MASS INDEX (BMI) OF 34.0 TO 34.9 IN ADULT: Chronic | ICD-10-CM

## 2024-10-30 DIAGNOSIS — G47.33 OSA ON CPAP: Chronic | ICD-10-CM

## 2024-10-30 PROCEDURE — G0439 PPPS, SUBSEQ VISIT: HCPCS | Mod: ,,, | Performed by: NURSE PRACTITIONER

## 2024-10-30 PROCEDURE — G0008 ADMIN INFLUENZA VIRUS VAC: HCPCS | Mod: ,,, | Performed by: NURSE PRACTITIONER

## 2024-10-30 PROCEDURE — 90653 IIV ADJUVANT VACCINE IM: CPT | Mod: ,,, | Performed by: NURSE PRACTITIONER

## 2025-01-03 ENCOUNTER — OFFICE VISIT (OUTPATIENT)
Dept: DERMATOLOGY | Facility: CLINIC | Age: 70
End: 2025-01-03
Payer: MEDICARE

## 2025-01-03 DIAGNOSIS — L72.0 EPIDERMAL CYST: ICD-10-CM

## 2025-01-03 DIAGNOSIS — L20.9 ATOPIC DERMATITIS, UNSPECIFIED TYPE: Primary | ICD-10-CM

## 2025-01-03 RX ORDER — CLOBETASOL PROPIONATE 0.5 MG/G
OINTMENT TOPICAL 2 TIMES DAILY
Qty: 60 G | Refills: 11 | Status: SHIPPED | OUTPATIENT
Start: 2025-01-03

## 2025-01-03 RX ORDER — SEMAGLUTIDE 1.34 MG/ML
1 INJECTION, SOLUTION SUBCUTANEOUS
COMMUNITY
Start: 2024-12-12

## 2025-01-03 RX ORDER — TRIAMCINOLONE ACETONIDE 1 MG/G
CREAM TOPICAL 2 TIMES DAILY
Qty: 454 G | Refills: 2 | Status: CANCELLED | OUTPATIENT
Start: 2025-01-03

## 2025-01-03 RX ORDER — DOXYCYCLINE 100 MG/1
100 CAPSULE ORAL 2 TIMES DAILY
Qty: 14 CAPSULE | Refills: 1 | Status: SHIPPED | OUTPATIENT
Start: 2025-01-03 | End: 2025-01-10

## 2025-01-03 NOTE — PROGRESS NOTES
[facility]  Sven Baird MD    [address] [address]  [phone]    Fax: [phone]    Patient Name: Hung Turner  Medical Record Number: [id number]  PCP: Jil Barajas FNP  Age: 69 y.o. : 1955  Contact: [phone] (home)     History of Present Illness:     Hung Turner is a 69 y.o.  male here for follow up of atopic dermatitis treated with TAC 0.1% cream that has not improved. Patient is concerned with a lesion on the back that has been draining.     The patient has no other concerns today.    Review of Systems:     Unremarkable other than mentioned above.     Physical Exam:     General: Relaxed, oriented, alert    Skin examination of the scalp, face, neck, chest, back, abdomen, upper extremities and lower extremities were normal except for as listed below      Assessment and Plan:     1. Atopic Dermatitis   - eczematous plaques and papules located on the arms with lichenification    Status: moderate     Plan:   Topical Steroid: clobetasol 0.05% ointment     Counseling.    Skin care: Patient should bathe using lukewarm water with a mild cleanser and moisturize immediately after. Emollients should be applied at least 2-3 times daily. Avoid scented detergents or fabric softeners. Keep fingernails short. Avoid excessive hand washing.  Expectations: The patient is aware that eczema is chronic in nature and can improve with moisturizers and topical steroids and worsen with stress, scented soaps, detergents, scratching, dry skin, changes in weather and skin infections.    Contact office if: Eczema worsens or fails to improve despite several weeks of treatment; patient develops skin infections (such as: yellow honey colored crusts or cold sores).      2. Epidermal Cyst  - subcutaneous cyst with prominent follicular pore located on the upper back    Plan:   Epidermal Cysts can be excised if necessary.  - doxycycline 100 mg BID x 7 days    3.  High Risk Medication Monitoring : The risks and benefits of the medication were reviewed in full with the patient. Should any side effects occur, the patient will stop the medication and contact me immediately.    Doxycycline Counseling:     Please be aware of the side effects of doxycycline:   - photosensitivity and increased risk for sunburn. When exposed to sunlight, patients should wear protective clothing, sunglasses, and sunscreen.   - birth defects: avoid pregnancy while on therapy due to potential birth defects.  - headaches or vision changes if taking with accutane   - throat irritation: stay upright for at least 30 minutes after taking and drink with a large glass of water   - GI disturbance: take with food to help prevent upset stomach   - Do not take at the same time as dairy or calcium containing supplements, as they reduce absorption. You can continue to consume these, but make sure it is not within an hour of taking the doxycycline     Please call our office with any extreme side effects.         Sven Baird MD   Mohs Surgery/Dermatologic Oncology  Dermatology

## 2025-01-08 ENCOUNTER — OFFICE VISIT (OUTPATIENT)
Dept: FAMILY MEDICINE | Facility: CLINIC | Age: 70
End: 2025-01-08
Payer: MEDICARE

## 2025-01-08 VITALS
DIASTOLIC BLOOD PRESSURE: 85 MMHG | HEART RATE: 82 BPM | SYSTOLIC BLOOD PRESSURE: 120 MMHG | TEMPERATURE: 99 F | RESPIRATION RATE: 16 BRPM | BODY MASS INDEX: 32.61 KG/M2 | WEIGHT: 215.19 LBS | OXYGEN SATURATION: 97 % | HEIGHT: 68 IN

## 2025-01-08 DIAGNOSIS — E61.1 IRON DEFICIENCY: ICD-10-CM

## 2025-01-08 DIAGNOSIS — E78.5 DYSLIPIDEMIA DUE TO TYPE 2 DIABETES MELLITUS: ICD-10-CM

## 2025-01-08 DIAGNOSIS — E11.69 DYSLIPIDEMIA DUE TO TYPE 2 DIABETES MELLITUS: ICD-10-CM

## 2025-01-08 DIAGNOSIS — E11.65 TYPE 2 DIABETES MELLITUS WITH HYPERGLYCEMIA, WITHOUT LONG-TERM CURRENT USE OF INSULIN: Primary | Chronic | ICD-10-CM

## 2025-01-08 DIAGNOSIS — Z12.5 PROSTATE CANCER SCREENING: ICD-10-CM

## 2025-01-08 LAB
ANION GAP SERPL CALCULATED.3IONS-SCNC: 11 MMOL/L (ref 7–16)
BUN SERPL-MCNC: 16 MG/DL (ref 8–26)
BUN/CREAT SERPL: 18 (ref 6–20)
CALCIUM SERPL-MCNC: 9 MG/DL (ref 8.8–10)
CHLORIDE SERPL-SCNC: 106 MMOL/L (ref 98–107)
CO2 SERPL-SCNC: 25 MMOL/L (ref 23–31)
CREAT SERPL-MCNC: 0.91 MG/DL (ref 0.72–1.25)
EGFR (NO RACE VARIABLE) (RUSH/TITUS): 91 ML/MIN/1.73M2
EST. AVERAGE GLUCOSE BLD GHB EST-MCNC: 192 MG/DL
FERRITIN SERPL-MCNC: 262 NG/ML (ref 22–275)
GLUCOSE SERPL-MCNC: 123 MG/DL (ref 82–115)
HBA1C MFR BLD HPLC: 8.3 %
POTASSIUM SERPL-SCNC: 4.1 MMOL/L (ref 3.5–5.1)
PSA SERPL-MCNC: 1.25 NG/ML
SODIUM SERPL-SCNC: 138 MMOL/L (ref 136–145)

## 2025-01-08 PROCEDURE — 99214 OFFICE O/P EST MOD 30 MIN: CPT | Mod: ,,, | Performed by: NURSE PRACTITIONER

## 2025-01-08 PROCEDURE — 82728 ASSAY OF FERRITIN: CPT | Mod: ,,, | Performed by: CLINICAL MEDICAL LABORATORY

## 2025-01-08 PROCEDURE — 80048 BASIC METABOLIC PNL TOTAL CA: CPT | Mod: ,,, | Performed by: CLINICAL MEDICAL LABORATORY

## 2025-01-08 PROCEDURE — G0103 PSA SCREENING: HCPCS | Mod: ,,, | Performed by: CLINICAL MEDICAL LABORATORY

## 2025-01-08 PROCEDURE — 83036 HEMOGLOBIN GLYCOSYLATED A1C: CPT | Mod: ,,, | Performed by: CLINICAL MEDICAL LABORATORY

## 2025-01-08 RX ORDER — EMPAGLIFLOZIN 25 MG/1
25 TABLET, FILM COATED ORAL DAILY
Qty: 90 TABLET | Refills: 3 | Status: SHIPPED | OUTPATIENT
Start: 2025-01-08

## 2025-01-08 NOTE — PROGRESS NOTES
[facility]  [address] [place] [address]  Phone: [phone]  Fax: [phone]       PATIENT NAME: Hung Turner   : 1955    AGE: 69 y.o. DATE OF ENCOUNTER: 25    MRN: [id number]      PCP: Jil Barajas FNP    Subjective:   CHANGE CHIEF COMPLAINT      :[id number]}274}  Chief Complaint   Patient presents with    Diabetes     3 month follow up.     History of Present Illness    HPI:  Patient initiated Ozempic approximately 1 month ago. Initially, morning glucose readings were elevated, ranging between 140 and 150 mg/dL for 1 week. In the past 4 days, readings have decreased to below 140 mg/dL. Patient reports lightheadedness, fatigue, and feeling unwell. He notes a recurrence of belching and acid reflux symptoms. Patient observes that Ozempic has been reducing his food intake, which he perceives as beneficial. The practitioner explained that the belching and acid reflux could be side effects of Ozempic due to its slowing effect on the GI tract. Patient also reports having an infection on his back, for which he was prescribed doxycycline by a dermatologist, Dr. Sven Baird. The infection is improving with the antibiotic treatment.      ROS:  Constitutional: +fatigue         Allergies and Meds: 274}     Review of patient's allergies indicates:   Allergen Reactions    Codeine     Penicillins Rash     Note: - [employer] 2018    Rosuvastatin Rash     Note: - [employer] 2018        Current Outpatient Medications   Medication Sig Dispense Refill    aspirin 325 MG tablet Take 325 mg by mouth once daily.      clobetasol 0.05% (TEMOVATE) 0.05 % Oint Apply topically 2 (two) times daily. 60 g 11    cyanocobalamin, vitamin B-12, 2,500 mcg Tab Take 1 tablet by mouth once daily.      doxycycline (VIBRAMYCIN) 100 MG Cap Take 1 capsule (100 mg total) by mouth 2 (two) times a day. for 7 days 14 capsule 1    ezetimibe (ZETIA) 10 mg tablet Take 1 tablet (10 mg total) by  "mouth once daily. 90 tablet 3    ferrous sulfate (FEOSOL) 325 mg (65 mg iron) Tab tablet Take 325 mg by mouth daily with breakfast.      glimepiride (AMARYL) 4 MG tablet Take 2 tablets (8 mg total) by mouth daily with breakfast. 180 tablet 3    levothyroxine (SYNTHROID) 25 MCG tablet Take 1 tablet (25 mcg total) by mouth before breakfast. Take 1st thing every am w/ full glass water, wait at least 30 min for other meds or intake. 90 tablet 3    magnesium citrate 85 mg Chew Take 2 capsules by mouth nightly.      melatonin 10 mg Cap Take 1 capsule by mouth nightly as needed.      omega 3-dha-epa-fish oil (FISH OIL) 1,200 (144-216) mg Cap Take 1 capsule by mouth 2 (two) times a day.      OZEMPIC 1 mg/dose (4 mg/3 mL) Inject 1 mg into the skin every 7 days.      pantoprazole (PROTONIX) 40 MG tablet Take 1 tablet (40 mg total) by mouth once daily. 90 tablet 3    pioglitazone (ACTOS) 45 MG tablet Take 1 tablet (45 mg total) by mouth once daily. 90 tablet 3    triamcinolone acetonide 0.1% (KENALOG) 0.1 % cream Apply topically 2 (two) times daily. 454 g 2    JARDIANCE 25 mg tablet Take 1 tablet (25 mg total) by mouth once daily. 90 tablet 3     No current facility-administered medications for this visit.       Labs:274}   I have reviewed labs below:    Lab Results   Component Value Date    WBC 6.33 07/03/2024    RBC 5.31 07/03/2024    HGB 16.8 07/03/2024    HCT 51.2 07/03/2024     07/03/2024     01/08/2025    K 4.1 01/08/2025     01/08/2025    CALCIUM 9.0 01/08/2025     (H) 01/08/2025    BUN 16 01/08/2025    CREATININE 0.91 01/08/2025    ALT 33 07/03/2024    AST 20 07/03/2024    CHOL 213 (H) 07/03/2024    TRIG 209 (H) 07/03/2024    HDL 44 07/03/2024    LDLCALC 127 07/03/2024    TSH 1.960 09/09/2024    PSA 1.255 01/08/2025    HGBA1C 8.3 (H) 01/08/2025    MICROALBUR 1.9 10/07/2024       Point Of Care Testing (if applicable):  No results found for: "[id number]", "FLUAMOLEC", "FLUBMOLEC", " ""MOLSTREPAPOC"  Any diagnostic testing results obtained in office or prior to appointment were reviewed were reviewed with patient.    Medical History: 274}     Past Medical History:   Diagnosis Date    Diabetic eye exam 12/04/2023    Dr. Colten Alvarez, OD - [facility]    Iron deficiency 11/26/2023    PONV (postoperative nausea and vomiting)     Positive colorectal cancer screening using Cologuard test 10/30/2023    Type 2 diabetes mellitus with hyperglycemia, without long-term current use of insulin 10/09/2023      Social History     Tobacco Use   Smoking Status Never    Passive exposure: Never   Smokeless Tobacco Never      Past Surgical History:   Procedure Laterality Date    CATHETERIZATION OF BOTH LEFT AND RIGHT HEART      COSMETIC SURGERY      bone cartilage removal from chest    HERNIA REPAIR      WISDOM TOOTH EXTRACTION          Health Maintenance:      Health Maintenance Topics with due status: Not Due       Topic Last Completion Date    TETANUS VACCINE 03/05/2020    Colorectal Cancer Screening 02/21/2024    Lipid Panel 07/03/2024    Diabetes Urine Screening 10/07/2024    PROSTATE-SPECIFIC ANTIGEN 01/08/2025    Foot Exam 01/08/2025    Hemoglobin A1c 01/08/2025       Objective:  274}   Vital Signs  Temp: 98.8 °F (37.1 °C)  Temp Source: Oral  Pulse: 82  Resp: 16  SpO2: 97 %  BP: 120/85  BP Location: Left arm  Patient Position: Sitting  Pain Score: 0-No pain  Height and Weight  Height: 5' 8" (172.7 cm)  Weight: 97.6 kg (215 lb 3.2 oz)  BSA (Calculated - sq m): 2.16 sq meters  BMI (Calculated): 32.7  Weight in (lb) to have BMI = 25: 164.1    Over the last two weeks how often have you been bothered by little interest or pleasure in doing things: 0  Over the last two weeks how often have you been bothered by feeling down, depressed or hopeless: 0  PHQ-2 Total Score: 0  PHQ-9 Score: 0  PHQ-9 Interpretation: Minimal or None    Wt Readings from Last 3 Encounters:   01/08/25 97.6 kg (215 lb 3.2 oz) "   10/30/24 102.1 kg (225 lb)   10/07/24 99.5 kg (219 lb 6.4 oz)     Physical Exam  Vitals and nursing note reviewed.   Constitutional:       General: He is not in acute distress.     Appearance: Normal appearance. He is not ill-appearing.   HENT:      Head: Normocephalic.   Eyes:      Conjunctiva/sclera: Conjunctivae normal.      Pupils: Pupils are equal, round, and reactive to light.   Neck:      Thyroid: No thyromegaly.      Vascular: No carotid bruit.      Trachea: Trachea normal.   Cardiovascular:      Rate and Rhythm: Normal rate and regular rhythm.      Pulses:           Dorsalis pedis pulses are 3+ on the right side and 3+ on the left side.        Posterior tibial pulses are 3+ on the right side and 3+ on the left side.      Heart sounds: Normal heart sounds.   Pulmonary:      Effort: Pulmonary effort is normal. No respiratory distress.      Breath sounds: Normal breath sounds. No wheezing, rhonchi or rales.   Musculoskeletal:      Cervical back: Neck supple.      Right lower leg: No edema.      Left lower leg: No edema.      Right foot: Normal range of motion. No deformity or bunion.      Left foot: Normal range of motion. No deformity or bunion.   Feet:      Right foot:      Protective Sensation: 6 sites tested.  6 sites sensed.      Skin integrity: Skin integrity normal. No ulcer, blister, erythema, warmth or callus.      Toenail Condition: Right toenails are normal.      Left foot:      Protective Sensation: 6 sites tested.  6 sites sensed.      Skin integrity: Skin integrity normal. No ulcer, blister, erythema, warmth or callus.      Toenail Condition: Left toenails are normal.   Lymphadenopathy:      Cervical: No cervical adenopathy.      Upper Body:      Right upper body: No supraclavicular adenopathy.      Left upper body: No supraclavicular adenopathy.   Skin:     General: Skin is warm and dry.      Coloration: Skin is not jaundiced or pale.      Findings: No rash.   Neurological:      General: No  focal deficit present.      Mental Status: He is alert and oriented to person, place, and time.      Gait: Gait normal.   Psychiatric:         Mood and Affect: Mood normal.         Behavior: Behavior normal.          Assessment and Plan: 274}       1. Type 2 diabetes mellitus with hyperglycemia, without long-term current use of insulin  Assessment & Plan:  Lab Results   Component Value Date    HGBA1C 8.8 (H) 10/07/2024    HGBA1C 6.4 07/03/2024    HGBA1C 7.7 (H) 03/04/2024     Continue Ozempic but discussed modifications to make to prevent belching and reflux.  Restart Jardiance if able and if so will decrease glimepiride to 4 mg daily with breakfast.  If unable to get Jardiance, continue 8 mg with breakfast.    Orders:  -     Basic Metabolic Panel; Future; Expected date: 01/08/2025  -     Hemoglobin A1C; Future; Expected date: 01/08/2025  -     Ambulatory referral/consult to Optometry; Future; Expected date: 01/08/2025  -     JARDIANCE 25 mg tablet; Take 1 tablet (25 mg total) by mouth once daily.  Dispense: 90 tablet; Refill: 3    2. Dyslipidemia due to type 2 diabetes mellitus  Overview:  Can't take statins.  Fairly well controlled with zetia.      3. Prostate cancer screening  -     PSA, Screening; Future; Expected date: 01/08/2025    4. Iron deficiency  -     Ferritin; Future; Expected date: 01/08/2025        Assessment & Plan    IMPRESSION:  - Continued treatment for uncontrolled diabetes with Ozempic  - Considered restarting Jardiance for additional benefits (blood sugar regulation, kidney protection, heart failure prevention) pending insurance coverage  - Planned to adjust Glimepiride dosage if Jardiance is restarted to avoid excessive blood sugar lowering  - Monitored recent improvements in blood sugar, likely due to Ozempic reaching therapeutic levels after 6 weeks  - Assessed GI symptoms (belching, acid reflux) as potential side effects of Ozempic  - Evaluated iron supplementation needs based on upcoming  ferritin lab results  - Noted patient's report of lightheadedness, fatigue, and acid reflux symptoms    TYPE 2 DIABETES MELLITUS WITH OTHER SPECIFIED COMPLICATION:  - Monitored patient's morning glucose readings, which have improved over the past week, with levels below 140 for the last 4 days.  - This improvement is likely due to restarting Ozempic over a month ago.  - Explained that Ozempic takes about 6 weeks to become fully therapeutic in the system.  - Ordered A1C lab test to further assess glycemic control.    ACID REFLUX:  - Monitored patient's reports of belching and acid reflux, likely due to Ozempic slowing GI tract motility.  - Continued Pantoprazole (Protonix) every morning for reflux management.  - Advised drinking water throughout the day, eating smaller amounts, and avoiding late evening meals to manage indigestion and belching.    BACK INFECTION:  - Monitored patient's report of improvement.  - On Doxycycline 100 mg twice daily for 7 days (as prescribed by Dr. Sven Baird).  - Instructed patient to take thyroid medication first thing in the morning, wait 1 hour before taking antibiotic, and wait 2 hours after Doxycycline before taking other medications which have potential interactions.    ANEMIA:  - Ordered Ferritin lab test to determine if the patient needs to continue iron supplementation.    LIGHTHEADEDNESS:  - Monitored patient's report of experiencing lightheadedness.    FATIGUE:  - Monitored patient's report of feeling tired and run down.    DIABETES:  - Continued Glimepiride 2 tablets daily.  - Planned to reintroduce Jardiance and reduce Glimepiride to 1 tablet daily if Jardiance is approved.  - Educated patient on managing GI side effects from Ozempic: eat smaller amounts, avoid overeating, and avoid late evening meals to manage indigestion and blood sugar.    WEIGHT MANAGEMENT:  - Advised patient to drink water throughout the day, eat smaller amounts to manage GI symptoms, and avoid eating  late in the evening.    TOENAIL CARE:  - Instructed on proper toenail care: soak before trimming to prevent splitting, especially for thick nails.    FOLLOW UP:  - Follow up in 3 months for uncontrolled T2DM.  - Contact the office to clarify insurance coverage and payment options for Jardiance.         Signature:  HOSSEIN Lambert-BC    Future Appointments   Date Time Provider Department [place]   4/9/2025 11:00 AM Jil Barajas FNP [facility] [place]   4/25/2025 10:15 AM Luba Baird MD [facility]   11/5/2025 10:00 AM AWV NURSE, [facility] [place]     This note was generated with the assistance of ambient listening technology. Verbal consent was obtained by the patient and accompanying visitor(s) for the recording of patient appointment to facilitate this note. I attest to having reviewed and edited the generated note for accuracy, though some syntax or spelling errors may persist. Please contact the author of this note for any clarification.

## 2025-01-08 NOTE — ASSESSMENT & PLAN NOTE
Lab Results   Component Value Date    HGBA1C 8.8 (H) 10/07/2024    HGBA1C 6.4 07/03/2024    HGBA1C 7.7 (H) 03/04/2024     Continue Ozempic but discussed modifications to make to prevent belching and reflux.  Restart Jardiance if able and if so will decrease glimepiride to 4 mg daily with breakfast.  If unable to get Jardiance, continue 8 mg with breakfast.

## 2025-01-17 LAB
LEFT EYE DM RETINOPATHY: NEGATIVE
RIGHT EYE DM RETINOPATHY: NEGATIVE

## 2025-03-10 DIAGNOSIS — E11.65 TYPE 2 DIABETES MELLITUS WITH HYPERGLYCEMIA, WITHOUT LONG-TERM CURRENT USE OF INSULIN: Primary | Chronic | ICD-10-CM

## 2025-03-10 RX ORDER — SEMAGLUTIDE 1.34 MG/ML
1 INJECTION, SOLUTION SUBCUTANEOUS
Qty: 9 ML | Refills: 3 | Status: SHIPPED | OUTPATIENT
Start: 2025-03-10 | End: 2026-03-10

## 2025-04-09 ENCOUNTER — PATIENT OUTREACH (OUTPATIENT)
Facility: HOSPITAL | Age: 70
End: 2025-04-09
Payer: MEDICARE

## 2025-04-09 ENCOUNTER — RESULTS FOLLOW-UP (OUTPATIENT)
Dept: FAMILY MEDICINE | Facility: CLINIC | Age: 70
End: 2025-04-09

## 2025-04-09 ENCOUNTER — OFFICE VISIT (OUTPATIENT)
Dept: FAMILY MEDICINE | Facility: CLINIC | Age: 70
End: 2025-04-09
Payer: MEDICARE

## 2025-04-09 VITALS
HEIGHT: 68 IN | TEMPERATURE: 98 F | WEIGHT: 202 LBS | RESPIRATION RATE: 18 BRPM | SYSTOLIC BLOOD PRESSURE: 120 MMHG | HEART RATE: 80 BPM | DIASTOLIC BLOOD PRESSURE: 82 MMHG | BODY MASS INDEX: 30.62 KG/M2 | OXYGEN SATURATION: 98 %

## 2025-04-09 DIAGNOSIS — E78.5 DYSLIPIDEMIA DUE TO TYPE 2 DIABETES MELLITUS: Primary | Chronic | ICD-10-CM

## 2025-04-09 DIAGNOSIS — T46.6X5A MYALGIA DUE TO STATIN: Chronic | ICD-10-CM

## 2025-04-09 DIAGNOSIS — E78.5 DYSLIPIDEMIA DUE TO TYPE 2 DIABETES MELLITUS: Chronic | ICD-10-CM

## 2025-04-09 DIAGNOSIS — E11.65 TYPE 2 DIABETES MELLITUS WITH HYPERGLYCEMIA, WITHOUT LONG-TERM CURRENT USE OF INSULIN: Primary | Chronic | ICD-10-CM

## 2025-04-09 DIAGNOSIS — E11.69 DYSLIPIDEMIA DUE TO TYPE 2 DIABETES MELLITUS: Primary | Chronic | ICD-10-CM

## 2025-04-09 DIAGNOSIS — E03.9 HYPOTHYROIDISM (ACQUIRED): Chronic | ICD-10-CM

## 2025-04-09 DIAGNOSIS — M79.10 MYALGIA DUE TO STATIN: Chronic | ICD-10-CM

## 2025-04-09 DIAGNOSIS — E11.69 DYSLIPIDEMIA DUE TO TYPE 2 DIABETES MELLITUS: Chronic | ICD-10-CM

## 2025-04-09 LAB
ALBUMIN SERPL BCP-MCNC: 4 G/DL (ref 3.4–4.8)
ALBUMIN/GLOB SERPL: 1.2 {RATIO}
ALP SERPL-CCNC: 87 U/L (ref 40–150)
ALT SERPL W P-5'-P-CCNC: 18 U/L
ANION GAP SERPL CALCULATED.3IONS-SCNC: 15 MMOL/L (ref 7–16)
AST SERPL W P-5'-P-CCNC: 21 U/L (ref 11–45)
BILIRUB SERPL-MCNC: 0.6 MG/DL
BUN SERPL-MCNC: 20 MG/DL (ref 8–26)
BUN/CREAT SERPL: 22 (ref 6–20)
CALCIUM SERPL-MCNC: 9 MG/DL (ref 8.8–10)
CHLORIDE SERPL-SCNC: 105 MMOL/L (ref 98–107)
CHOLEST SERPL-MCNC: 208 MG/DL
CHOLEST/HDLC SERPL: 5.5 {RATIO}
CO2 SERPL-SCNC: 24 MMOL/L (ref 23–31)
CREAT SERPL-MCNC: 0.9 MG/DL (ref 0.72–1.25)
EGFR (NO RACE VARIABLE) (RUSH/TITUS): 92 ML/MIN/1.73M2
EST. AVERAGE GLUCOSE BLD GHB EST-MCNC: 157 MG/DL
GLOBULIN SER-MCNC: 3.4 G/DL (ref 2–4)
GLUCOSE SERPL-MCNC: 109 MG/DL (ref 82–115)
HBA1C MFR BLD HPLC: 7.1 %
HDLC SERPL-MCNC: 38 MG/DL (ref 35–60)
LDLC SERPL CALC-MCNC: 131 MG/DL
LDLC/HDLC SERPL: 3.4 {RATIO}
NONHDLC SERPL-MCNC: 170 MG/DL
POTASSIUM SERPL-SCNC: 4.3 MMOL/L (ref 3.5–5.1)
PROT SERPL-MCNC: 7.4 G/DL (ref 5.8–7.6)
SODIUM SERPL-SCNC: 140 MMOL/L (ref 136–145)
TRIGL SERPL-MCNC: 197 MG/DL (ref 34–140)
TSH SERPL DL<=0.005 MIU/L-ACNC: 1.64 UIU/ML (ref 0.35–4.94)
VLDLC SERPL-MCNC: 39 MG/DL

## 2025-04-09 PROCEDURE — 80053 COMPREHEN METABOLIC PANEL: CPT | Mod: ,,, | Performed by: CLINICAL MEDICAL LABORATORY

## 2025-04-09 PROCEDURE — 83036 HEMOGLOBIN GLYCOSYLATED A1C: CPT | Mod: ,,, | Performed by: CLINICAL MEDICAL LABORATORY

## 2025-04-09 PROCEDURE — 80061 LIPID PANEL: CPT | Mod: ,,, | Performed by: CLINICAL MEDICAL LABORATORY

## 2025-04-09 PROCEDURE — 84443 ASSAY THYROID STIM HORMONE: CPT | Mod: ,,, | Performed by: CLINICAL MEDICAL LABORATORY

## 2025-04-09 PROCEDURE — 99214 OFFICE O/P EST MOD 30 MIN: CPT | Mod: ,,, | Performed by: NURSE PRACTITIONER

## 2025-04-09 RX ORDER — LEVOTHYROXINE SODIUM 25 UG/1
25 TABLET ORAL
Qty: 90 TABLET | Refills: 3 | Status: CANCELLED | OUTPATIENT
Start: 2025-04-09 | End: 2026-04-09

## 2025-04-09 RX ORDER — PIOGLITAZONEHYDROCHLORIDE 45 MG/1
45 TABLET ORAL DAILY
Qty: 90 TABLET | Refills: 3 | Status: SHIPPED | OUTPATIENT
Start: 2025-04-09

## 2025-04-09 RX ORDER — EZETIMIBE 10 MG/1
10 TABLET ORAL DAILY
Qty: 90 TABLET | Refills: 3 | Status: SHIPPED | OUTPATIENT
Start: 2025-04-09

## 2025-04-09 NOTE — PROGRESS NOTES
Population Health Chart Review & Patient Outreach Details    Health Maintenance Topics Addressed and Outreach Outcomes / Actions Taken:  Diabetic Eye Exam [x] KEYONA sent to [facility].

## 2025-04-09 NOTE — ASSESSMENT & PLAN NOTE
Lab Results   Component Value Date    HGBA1C 8.3 (H) 01/08/2025    HGBA1C 8.8 (H) 10/07/2024    HGBA1C 6.4 07/03/2024     Continue Ozempic; no further issues with belching and reflux.  Continue Jardiance 25 mg.  Continue glimepiride 4 mg daily with breakfast.   Continue pioglitazone 45 mg daily.

## 2025-04-09 NOTE — LETTER
AUTHORIZATION FOR RELEASE OF   CONFIDENTIAL INFORMATION    Dear [facility],    We are seeing Hung Turner, date of birth 1955, in the clinic at [facility]. Jil Barajas FNP is the patient's PCP. Hung Turner has an outstanding lab/procedure at the time we reviewed his chart. In order to help keep his health information updated, he has authorized us to request the following medical record(s):        (  )  MAMMOGRAM                                      (  )  COLONOSCOPY      (  )  PAP SMEAR                                          (  )  OUTSIDE LAB RESULTS     (  )  DEXA SCAN                                          ( x )  EYE EXAM            (  )  FOOT EXAM                                          (  )  ENTIRE RECORD     (  )  OUTSIDE IMMUNIZATIONS                 (  )  _______________         Please fax records to Brittney Alvarez LPN Care Coordinator at [phone].      If you have any questions, please contact Brittney at [phone].           Patient Name: Hung Turner  : 1955  Patient Phone #: [phone]

## 2025-04-09 NOTE — PROGRESS NOTES
[facility]  [address] [place] [address]  Phone: [phone]  Fax: [phone]       PATIENT NAME: Hung Turner   : 1955    AGE: 69 y.o. DATE OF ENCOUNTER: 25    MRN: [id number]      PCP: Jil Barajas FNP    Subjective:   CHANGE CHIEF COMPLAINT      :[id number]}274}  Chief Complaint   Patient presents with    Hyperlipidemia     Patient reports to the clinic today for his 3 month follow up and fasting labs.    Health Maintenance     Care gaps addressed, patient had his eye exam in December at [facility].    Meera Lee CMA    Diabetes     History of Present Illness    HPI:  Patient is a 69-year-old male with a history of uncontrolled diabetes and dyslipidemia. His last A1c decreased minimally from 8.8% in 2024 to 8.3% on 2025. He has been taking Ozempic and was recommended to resume Jardiance along with Glimepiride and Actos. He has lost about 13 lbs since his last visit in January. He mentions fatigue which is chronic, stating it takes time to become energized in the morning. He has been monitoring his blood sugar at home, with recent fasting glucose readings ranging from 90 to 151 mg/dL. His blood sugar tends to increase as he approaches his next Ozempic dose, which he takes on . He had one instance of very low blood sugar (39 mg/dL) in the morning, but questioned the accuracy of this reading as this was an isolated incidents and he had no further such readings after changing the batteries in his meter. He expresses frustration with his decreased energy levels and reduced ability to perform activities. [facility].    He denies chest pain.      ROS:  Constitutional: +fatigue, +decreased energy levels  Cardiovascular: -chest pain  Gastrointestinal: -excessive belching         Allergies and Meds: 274}     Review of patient's allergies indicates:   Allergen Reactions    Codeine      Penicillins Rash     Note: - [employer] 01/08/2018    Rosuvastatin Rash     Note: - [employer] 01/08/2018        Current Outpatient Medications   Medication Sig Dispense Refill    aspirin 325 MG tablet Take 325 mg by mouth once daily.      clobetasol 0.05% (TEMOVATE) 0.05 % Oint Apply topically 2 (two) times daily. 60 g 11    glimepiride (AMARYL) 4 MG tablet Take 2 tablets (8 mg total) by mouth daily with breakfast. (Patient taking differently: Take 4 mg by mouth daily with breakfast.) 180 tablet 3    JARDIANCE 25 mg tablet Take 1 tablet (25 mg total) by mouth once daily. 90 tablet 3    levothyroxine (SYNTHROID) 25 MCG tablet Take 1 tablet (25 mcg total) by mouth before breakfast. Take 1st thing every am w/ full glass water, wait at least 30 min for other meds or intake. 90 tablet 3    magnesium citrate 85 mg Chew Take 2 capsules by mouth nightly.      melatonin 10 mg Cap Take 1 capsule by mouth nightly as needed.      omega 3-dha-epa-fish oil (FISH OIL) 1,200 (144-216) mg Cap Take 1 capsule by mouth 2 (two) times a day.      OZEMPIC 1 mg/dose (4 mg/3 mL) Inject 1 mg into the skin every 7 days. 9 mL 3    pantoprazole (PROTONIX) 40 MG tablet Take 1 tablet (40 mg total) by mouth once daily. 90 tablet 3    ezetimibe (ZETIA) 10 mg tablet Take 1 tablet (10 mg total) by mouth once daily. 90 tablet 3    pioglitazone (ACTOS) 45 MG tablet Take 1 tablet (45 mg total) by mouth once daily. 90 tablet 3     No current facility-administered medications for this visit.       Labs:274}   I have reviewed labs below:    Lab Results   Component Value Date    WBC 6.33 07/03/2024    RBC 5.31 07/03/2024    HGB 16.8 07/03/2024    HCT 51.2 07/03/2024     07/03/2024     01/08/2025    K 4.1 01/08/2025     01/08/2025    CALCIUM 9.0 01/08/2025     (H) 01/08/2025    BUN 16 01/08/2025    CREATININE 0.91 01/08/2025    ALT 33 07/03/2024    AST 20 07/03/2024    CHOL 213 (H) 07/03/2024    TRIG 209 (H) 07/03/2024    HDL 44  "07/03/2024    LDLCALC 127 07/03/2024    TSH 1.960 09/09/2024    PSA 1.255 01/08/2025    HGBA1C 8.3 (H) 01/08/2025    MICROALBUR 1.9 10/07/2024       Medical History: 274}     Past Medical History:   Diagnosis Date    Diabetic eye exam 12/04/2023    Dr. Colten Alvarez, OD - [facility]    Iron deficiency 11/26/2023    PONV (postoperative nausea and vomiting)     Positive colorectal cancer screening using Cologuard test 10/30/2023    Type 2 diabetes mellitus with hyperglycemia, without long-term current use of insulin 10/09/2023      Tobacco Use History[1]   Past Surgical History:   Procedure Laterality Date    CATHETERIZATION OF BOTH LEFT AND RIGHT HEART      COSMETIC SURGERY      bone cartilage removal from chest    HERNIA REPAIR      WISDOM TOOTH EXTRACTION          Health Maintenance:      Health Maintenance Topics with due status: Not Due       Topic Last Completion Date    TETANUS VACCINE 03/05/2020    Colorectal Cancer Screening 02/21/2024    Lipid Panel 07/03/2024    Diabetes Urine Screening 10/07/2024    PROSTATE-SPECIFIC ANTIGEN 01/08/2025    Foot Exam 01/08/2025       Objective:  274}   Vital Signs  Temp: 97.7 °F (36.5 °C)  Temp Source: Oral  Pulse: 80  Resp: 18  SpO2: 98 %  BP: 120/82  BP Location: Left arm  Patient Position: Sitting  Pain Score: 0-No pain  Height and Weight  Height: 5' 8" (172.7 cm)  Weight: 91.6 kg (202 lb)  BSA (Calculated - sq m): 2.1 sq meters  BMI (Calculated): 30.7  Weight in (lb) to have BMI = 25: 164.1    Over the last two weeks how often have you been bothered by little interest or pleasure in doing things: 0  Over the last two weeks how often have you been bothered by feeling down, depressed or hopeless: 0  PHQ-2 Total Score: 0  PHQ-9 Score: 0  PHQ-9 Interpretation: Minimal or None    Wt Readings from Last 3 Encounters:   04/09/25 91.6 kg (202 lb)   01/08/25 97.6 kg (215 lb 3.2 oz)   10/30/24 102.1 kg (225 lb)     Physical Exam  Vitals and nursing note reviewed. "   Constitutional:       General: He is not in acute distress.     Appearance: Normal appearance. He is not ill-appearing.   HENT:      Head: Normocephalic.   Eyes:      Conjunctiva/sclera: Conjunctivae normal.   Neck:      Trachea: Trachea normal.   Cardiovascular:      Rate and Rhythm: Normal rate and regular rhythm.      Pulses: Normal pulses.      Heart sounds: Normal heart sounds.   Pulmonary:      Effort: Pulmonary effort is normal. No respiratory distress.      Breath sounds: Normal breath sounds. No wheezing, rhonchi or rales.   Musculoskeletal:      Cervical back: Neck supple.      Right lower leg: No edema.      Left lower leg: No edema.   Lymphadenopathy:      Cervical: No cervical adenopathy.   Skin:     General: Skin is warm and dry.      Coloration: Skin is not jaundiced or pale.   Neurological:      Mental Status: He is alert and oriented to person, place, and time.      Gait: Gait normal.   Psychiatric:         Mood and Affect: Mood normal.         Behavior: Behavior normal.          Assessment and Plan: 274}       1. Type 2 diabetes mellitus with hyperglycemia, without long-term current use of insulin  Assessment & Plan:  Lab Results   Component Value Date    HGBA1C 8.3 (H) 01/08/2025    HGBA1C 8.8 (H) 10/07/2024    HGBA1C 6.4 07/03/2024     Continue Ozempic; no further issues with belching and reflux.  Continue Jardiance 25 mg.  Continue glimepiride 4 mg daily with breakfast.   Continue pioglitazone 45 mg daily.    Orders:  -     pioglitazone (ACTOS) 45 MG tablet; Take 1 tablet (45 mg total) by mouth once daily.  Dispense: 90 tablet; Refill: 3  -     Hemoglobin A1C; Future; Expected date: 04/09/2025  -     Comprehensive Metabolic Panel; Future; Expected date: 04/09/2025    2. Hypothyroidism (acquired)  Assessment & Plan:  Lab Results   Component Value Date    TSH 1.960 09/09/2024     Continue levothyroxine 25 mcg daily and continue.    Orders:  -     TSH; Future; Expected date: 04/09/2025    3.  Dyslipidemia due to type 2 diabetes mellitus  Overview:  Can't take statins.  Fairly well controlled with zetia.    Orders:  -     ezetimibe (ZETIA) 10 mg tablet; Take 1 tablet (10 mg total) by mouth once daily.  Dispense: 90 tablet; Refill: 3  -     Comprehensive Metabolic Panel; Future; Expected date: 04/09/2025  -     Lipid Panel; Future; Expected date: 04/09/2025    4. Myalgia due to statin  Overview:  Unable to take statins.          Assessment & Plan    IMPRESSION:  - A1c decreased minimally from 8.8 to 8.3 over 3 months.  - Weight loss of 13 lbs since January, potentially due to Ozempic and Jardiance.  - /82, noting slight increase in diastolic pressure but still within acceptable range.  - Blood glucose levels increasing as Ozempic dose wears off towards end of weekly cycle.  - Considered potential need for BP management in the future due to diabetes-related risks.    TYPE 2 DIABETES MELLITUS:  - Educated the patient on kidney-protective effects of Jardiance.  - Continued Ozempic 1 mg weekly.  - Resumed Jardiance 25 mg daily last visit.  - Previously decreased Glimepiride from 2 tablets to 1 tablet (4 mg) with breakfast.  - Continued Actos 45 mg daily.  - Noted the patient's last A1c decreased from 8.8 in October 2024 to 8.3 three months ago on 01/08/2025.  - Observed current fasting blood sugar readings range from 90 to 151 mg/dL.  - Evaluated the patient's blood sugar log, noting a trend of increasing levels before the next Ozempic dose.  - Ordered A1c test today to evaluate treatment effectiveness.  - Instructed the patient to continue monitoring blood sugar regularly and keeping a log.    HYPERLIPIDEMIA:  - Continued Zetia 10 mg daily for dyslipidemia management, as the patient is unable to tolerate statins due to myalgias.  - Ordered lipid panel to assess current control.  - Noted the patient's history of dyslipidemia with previous suboptimal control.    HYPOTHYROIDISM:  - Continued Levothyroxine  25 mcg daily, pending lab results for potential dose adjustment.  - Ordered thyroid function test to recheck thyroid function.  - Considered potential increase to 50 mcg if needed based on new thyroid function test results.    WEIGHT LOSS:  - Noted the patient has lost about 13 lbs since January.  - Recorded current weight as 202 lbs (91.6 kg).  - Evaluated weight loss as a positive outcome, likely due to Ozempic and Jardiance.    FATIGUE:  - Noted the patient reports lack of energy and difficulty getting started in the morning.  - Acknowledged fatigue may be related to age and emphasized the importance of staying active.  - Advised the patient to push themselves to be more active to combat fatigue.  - Explained importance of physical activity in maintaining energy levels, especially with advancing age.    STATIN INTOLERANCE:  - Noted the patient is unable to tolerate statins due to myalgias.  - Prescribed alternative treatment (Zetia) for dyslipidemia due to statin intolerance.       Follow up in about 3 months (around 7/9/2025) for uncontrolled diabetes.    Signature:  HOSSEIN [facility]-BC    Future Appointments   Date Time Provider Department [place]   4/25/2025 10:15 AM Luba Baird MD [facility]   7/9/2025  3:00 PM Jil Barajas FNP [facility] [place]   11/5/2025 10:00 AM AWV NURSE, [facility] [place]     This note was generated with the assistance of ambient listening technology. Verbal consent was obtained by the patient and accompanying visitor(s) for the recording of patient appointment to facilitate this note. I attest to having reviewed and edited the generated note for accuracy, though some syntax or spelling errors may persist. Please contact the author of this note for any clarification.           [1]   Social History  Tobacco Use   Smoking Status Never    Passive exposure: Never   Smokeless Tobacco Never

## 2025-04-09 NOTE — ASSESSMENT & PLAN NOTE
Lab Results   Component Value Date    TSH 1.960 09/09/2024     Continue levothyroxine 25 mcg daily and continue.

## 2025-04-13 NOTE — PROGRESS NOTES
Call patient and review results. DM control is continuing to improve with A1c down from 8.3% to 7.1%.  Lipids are not controlled on zetia 10 mg.  I know he can't take statins, but I wonder about adding fenofibrate with zetia?

## 2025-04-14 ENCOUNTER — PATIENT OUTREACH (OUTPATIENT)
Facility: HOSPITAL | Age: 70
End: 2025-04-14
Payer: MEDICARE

## 2025-04-14 DIAGNOSIS — E11.65 TYPE 2 DIABETES MELLITUS WITH HYPERGLYCEMIA, WITHOUT LONG-TERM CURRENT USE OF INSULIN: Chronic | ICD-10-CM

## 2025-04-14 DIAGNOSIS — E03.9 HYPOTHYROIDISM (ACQUIRED): Chronic | ICD-10-CM

## 2025-04-14 RX ORDER — SEMAGLUTIDE 1.34 MG/ML
1 INJECTION, SOLUTION SUBCUTANEOUS
Qty: 9 ML | Refills: 3 | Status: SHIPPED | OUTPATIENT
Start: 2025-04-14 | End: 2026-04-14

## 2025-04-14 RX ORDER — FENOFIBRATE 160 MG/1
160 TABLET ORAL DAILY
Qty: 90 TABLET | Refills: 3 | Status: SHIPPED | OUTPATIENT
Start: 2025-04-14 | End: 2026-04-14

## 2025-04-14 RX ORDER — LEVOTHYROXINE SODIUM 25 UG/1
25 TABLET ORAL
Qty: 90 TABLET | Refills: 3 | Status: SHIPPED | OUTPATIENT
Start: 2025-04-14 | End: 2026-04-14

## 2025-04-14 NOTE — PROGRESS NOTES
Population Health Chart Review & Patient Outreach Details    Health Maintenance Topics Addressed and Outreach Outcomes / Actions Taken:  Diabetic Eye Exam [x] HM Updated with January 2025 Eye Exam (Dr. Alvarez). History Updated.

## 2025-04-22 ENCOUNTER — OFFICE VISIT (OUTPATIENT)
Dept: DERMATOLOGY | Facility: CLINIC | Age: 70
End: 2025-04-22
Payer: MEDICARE

## 2025-04-22 DIAGNOSIS — L20.9 ATOPIC DERMATITIS, UNSPECIFIED TYPE: Primary | ICD-10-CM

## 2025-07-09 ENCOUNTER — OFFICE VISIT (OUTPATIENT)
Dept: FAMILY MEDICINE | Facility: CLINIC | Age: 70
End: 2025-07-09
Payer: MEDICARE

## 2025-07-09 VITALS
OXYGEN SATURATION: 96 % | TEMPERATURE: 98 F | HEIGHT: 68 IN | DIASTOLIC BLOOD PRESSURE: 70 MMHG | SYSTOLIC BLOOD PRESSURE: 108 MMHG | HEART RATE: 80 BPM | RESPIRATION RATE: 20 BRPM | BODY MASS INDEX: 31.22 KG/M2 | WEIGHT: 206 LBS

## 2025-07-09 DIAGNOSIS — M79.10 MYALGIA DUE TO STATIN: Chronic | ICD-10-CM

## 2025-07-09 DIAGNOSIS — T46.6X5A MYALGIA DUE TO STATIN: Chronic | ICD-10-CM

## 2025-07-09 DIAGNOSIS — E11.65 TYPE 2 DIABETES MELLITUS WITH HYPERGLYCEMIA, WITHOUT LONG-TERM CURRENT USE OF INSULIN: Primary | Chronic | ICD-10-CM

## 2025-07-09 DIAGNOSIS — E03.9 HYPOTHYROIDISM (ACQUIRED): Chronic | ICD-10-CM

## 2025-07-09 LAB
ANION GAP SERPL CALCULATED.3IONS-SCNC: 15 MMOL/L (ref 7–16)
BUN SERPL-MCNC: 17 MG/DL (ref 8–26)
BUN/CREAT SERPL: 15 (ref 6–20)
CALCIUM SERPL-MCNC: 9 MG/DL (ref 8.8–10)
CHLORIDE SERPL-SCNC: 107 MMOL/L (ref 98–107)
CO2 SERPL-SCNC: 21 MMOL/L (ref 23–31)
CREAT SERPL-MCNC: 1.13 MG/DL (ref 0.72–1.25)
CREAT UR-MCNC: 85 MG/DL (ref 23–375)
EGFR (NO RACE VARIABLE) (RUSH/TITUS): 70 ML/MIN/1.73M2
EST. AVERAGE GLUCOSE BLD GHB EST-MCNC: 151 MG/DL
GLUCOSE SERPL-MCNC: 157 MG/DL (ref 82–115)
HBA1C MFR BLD HPLC: 6.9 %
MICROALBUMIN UR-MCNC: <0.5 MG/DL
MICROALBUMIN/CREAT RATIO PNL UR: NORMAL
POTASSIUM SERPL-SCNC: 4.1 MMOL/L (ref 3.5–5.1)
SODIUM SERPL-SCNC: 139 MMOL/L (ref 136–145)

## 2025-07-09 PROCEDURE — 80048 BASIC METABOLIC PNL TOTAL CA: CPT | Mod: ,,, | Performed by: CLINICAL MEDICAL LABORATORY

## 2025-07-09 PROCEDURE — 82043 UR ALBUMIN QUANTITATIVE: CPT | Mod: ,,, | Performed by: CLINICAL MEDICAL LABORATORY

## 2025-07-09 PROCEDURE — 83036 HEMOGLOBIN GLYCOSYLATED A1C: CPT | Mod: ,,, | Performed by: CLINICAL MEDICAL LABORATORY

## 2025-07-09 PROCEDURE — 99214 OFFICE O/P EST MOD 30 MIN: CPT | Mod: ,,, | Performed by: NURSE PRACTITIONER

## 2025-07-09 PROCEDURE — 82570 ASSAY OF URINE CREATININE: CPT | Mod: ,,, | Performed by: CLINICAL MEDICAL LABORATORY

## 2025-07-09 RX ORDER — ASPIRIN 81 MG/1
81 TABLET ORAL DAILY
COMMUNITY

## 2025-07-09 NOTE — ASSESSMENT & PLAN NOTE
Lab Results   Component Value Date    HGBA1C 7.1 (H) 04/09/2025    HGBA1C 8.3 (H) 01/08/2025    HGBA1C 8.8 (H) 10/07/2024     Continue Ozempic; no further issues with belching and reflux.  Continue Jardiance 25 mg.  Continue glimepiride 4 mg daily with breakfast.   Continue pioglitazone 45 mg daily.

## 2025-07-09 NOTE — ASSESSMENT & PLAN NOTE
Lab Results   Component Value Date    TSH 1.639 04/09/2025     Continue levothyroxine 25 mcg daily.   No care due was identified.  Health [facility] Embedded Care Due Messages. Reference number: [id number].   12/07/2024 11:01:29 AM CST

## 2025-07-09 NOTE — PROGRESS NOTES
[facility]  [address] [place] [address]  Phone: [phone]  Fax: [phone]       PATIENT NAME: Hung Turner   : 1955    AGE: 69 y.o. DATE OF ENCOUNTER: 25    MRN: [id number]      PCP: Jil Barajas FNP    Subjective:   CHANGE CHIEF COMPLAINT      :[id number]}274}  Chief Complaint   Patient presents with    Diabetes     Patient reports to the clinic today for 3 month follow up.    Health Maintenance     COVID-19 Vaccine(3 - 2024-25 season) declined       History of Present Illness    HPI:  Patient reports generally good blood sugar, with recent readings ranging from 113 to 137 mg/dL over the past few days, noting one atypical high reading of 216 mg/dL after breakfast. He is managing well with his current medications, including Ozempic. He reports ongoing fatigue, with minimal exertion leading to tiredness. Once he initiates movement, he can sustain activities until the afternoon. 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 He also reports easy bruising, attributed to age-related skin changes, diabetes, and his current medication regimen, particularly the daily 325mg aspirin.    He denies any new or worsening symptoms beyond the previously mentioned fatigue and easy bruising.      ROS:  Constitutional: +fatigue  Gastrointestinal: -excessive belching  Hematologic/Lymphatic: +easy bruising         Allergies and Meds: 274}     Review of patient's allergies indicates:   Allergen Reactions    Codeine     Penicillins Rash     Note: - [employer] 2018    Rosuvastatin Rash     Note: - [employer] 2018        Current Outpatient Medications   Medication Sig Dispense Refill    aspirin 325 MG tablet Take 325 mg by mouth once daily.      clobetasol 0.05% (TEMOVATE) 0.05 % Oint Apply topically 2 (two) times daily. 60 g 11    ezetimibe (ZETIA) 10 mg tablet Take 1 tablet  (10 mg total) by mouth once daily. 90 tablet 3    fenofibrate 160 MG Tab Take 1 tablet (160 mg total) by mouth once daily. 90 tablet 3    glimepiride (AMARYL) 4 MG tablet Take 2 tablets (8 mg total) by mouth daily with breakfast. (Patient taking differently: Take 4 mg by mouth daily with breakfast.) 180 tablet 3    JARDIANCE 25 mg tablet Take 1 tablet (25 mg total) by mouth once daily. 90 tablet 3    levothyroxine (SYNTHROID) 25 MCG tablet Take 1 tablet (25 mcg total) by mouth before breakfast. Take 1st thing every am w/ full glass water, wait at least 30 min for other meds or intake. 90 tablet 3    magnesium citrate 85 mg Chew Take 2 capsules by mouth nightly.      melatonin 10 mg Cap Take 1 capsule by mouth nightly as needed.      omega 3-dha-epa-fish oil (FISH OIL) 1,200 (144-216) mg Cap Take 1 capsule by mouth 2 (two) times a day.      OZEMPIC 1 mg/dose (4 mg/3 mL) Inject 1 mg into the skin every 7 days. 9 mL 3    pantoprazole (PROTONIX) 40 MG tablet Take 1 tablet (40 mg total) by mouth once daily. 90 tablet 3    pioglitazone (ACTOS) 45 MG tablet Take 1 tablet (45 mg total) by mouth once daily. 90 tablet 3     No current facility-administered medications for this visit.       Labs:274}   I have reviewed labs below:    Lab Results   Component Value Date    WBC 6.33 07/03/2024    RBC 5.31 07/03/2024    HGB 16.8 07/03/2024    HCT 51.2 07/03/2024     07/03/2024     04/09/2025    K 4.3 04/09/2025     04/09/2025    CALCIUM 9.0 04/09/2025     04/09/2025    BUN 20 04/09/2025    CREATININE 0.90 04/09/2025    ALT 18 04/09/2025    AST 21 04/09/2025    CHOL 208 (H) 04/09/2025    TRIG 197 (H) 04/09/2025    HDL 38 04/09/2025    LDLCALC 131 04/09/2025    TSH 1.639 04/09/2025    PSA 1.255 01/08/2025    HGBA1C 7.1 (H) 04/09/2025    MICROALBUR 1.9 10/07/2024       Medical History: 274}     Past Medical History:   Diagnosis Date    Diabetic eye exam 12/04/2023    Dr. Colten [facility]     "Diabetic eye exam 01/17/2025    Dr. Colten Alvarez - [facility]    Iron deficiency 11/26/2023    PONV (postoperative nausea and vomiting)     Positive colorectal cancer screening using Cologuard test 10/30/2023    Type 2 diabetes mellitus with hyperglycemia, without long-term current use of insulin 10/09/2023      Tobacco Use History[1]   Past Surgical History:   Procedure Laterality Date    CATHETERIZATION OF BOTH LEFT AND RIGHT HEART      COSMETIC SURGERY      bone cartilage removal from chest    HERNIA REPAIR      WISDOM TOOTH EXTRACTION          Health Maintenance:      Health Maintenance Topics with due status: Not Due       Topic Last Completion Date    TETANUS VACCINE 03/05/2020    Colorectal Cancer Screening 02/21/2024    Diabetes Urine Screening 10/07/2024    Influenza Vaccine 10/30/2024    PROSTATE-SPECIFIC ANTIGEN 01/08/2025    Foot Exam 01/08/2025    Diabetic Eye Exam 01/17/2025    Lipid Panel 04/09/2025    Hemoglobin A1c 04/09/2025       Objective:  274}   Vital Signs  Temp: 97.9 °F (36.6 °C)  Temp Source: Oral  Pulse: 80  Resp: 20  SpO2: 96 %  BP: 108/70  BP Location: Left arm  Patient Position: Sitting  Pain Score: 0-No pain  Height and Weight  Height: 5' 8" (172.7 cm)  Weight: 93.4 kg (206 lb)  BSA (Calculated - sq m): 2.12 sq meters  BMI (Calculated): 31.3  Weight in (lb) to have BMI = 25: 164.1    Over the last two weeks how often have you been bothered by little interest or pleasure in doing things: 0  Over the last two weeks how often have you been bothered by feeling down, depressed or hopeless: 0  PHQ-2 Total Score: 0  PHQ-9 Score: 0  PHQ-9 Interpretation: Minimal or None    Wt Readings from Last 3 Encounters:   07/09/25 93.4 kg (206 lb)   04/09/25 91.6 kg (202 lb)   01/08/25 97.6 kg (215 lb 3.2 oz)     Physical Exam  Vitals and nursing note reviewed.   Constitutional:       General: He is not in acute distress.     Appearance: Normal appearance. He is not ill-appearing.   HENT:      Head: " Normocephalic.   Eyes:      Conjunctiva/sclera: Conjunctivae normal.   Neck:      Trachea: Trachea normal.   Cardiovascular:      Rate and Rhythm: Normal rate and regular rhythm.      Pulses: Normal pulses.      Heart sounds: Normal heart sounds.   Pulmonary:      Effort: Pulmonary effort is normal. No respiratory distress.      Breath sounds: Normal breath sounds. No wheezing, rhonchi or rales.   Musculoskeletal:      Cervical back: Neck supple.      Right lower leg: No edema.      Left lower leg: No edema.   Lymphadenopathy:      Cervical: No cervical adenopathy.   Skin:     General: Skin is warm and dry.      Coloration: Skin is not jaundiced or pale.      Findings: Bruising (scattered to arms) present.   Neurological:      Mental Status: He is alert and oriented to person, place, and time.      Gait: Gait normal.   Psychiatric:         Mood and Affect: Mood normal.         Behavior: Behavior normal.          Assessment and Plan: 274}       1. Type 2 diabetes mellitus with hyperglycemia, without long-term current use of insulin  Assessment & Plan:  Lab Results   Component Value Date    HGBA1C 7.1 (H) 04/09/2025    HGBA1C 8.3 (H) 01/08/2025    HGBA1C 8.8 (H) 10/07/2024     Continue Ozempic; no further issues with belching and reflux.  Continue Jardiance 25 mg.  Continue glimepiride 4 mg daily with breakfast.   Continue pioglitazone 45 mg daily.    Orders:  -     Microalbumin/Creatinine Ratio, Urine  -     Basic Metabolic Panel  -     Hemoglobin A1C    2. Hypothyroidism (acquired)  Assessment & Plan:  Lab Results   Component Value Date    TSH 1.639 04/09/2025     Continue levothyroxine 25 mcg daily.      3. Myalgia due to statin  Overview:  Unable to take statins.          Assessment & Plan    IMPRESSION:  - Reviewed glucose readings, noting improvement with recent values mostly in target range.  - Assessed A1C, anticipating improvement from previous decrease from 8.3 to 7.1.  - Evaluated current diabetes  management regimen.  - Considered increased bruising, attributing it to age-related skin changes including loss of fat pad under skin leading to increased bruising susceptibility, diabetes, and current medication regimen.  - Reduced aspirin from 325 mg daily to 81 mg daily to address bruising concerns, based on updated medical recommendations and potential contribution to bruising.  - Opted not to modify other medications at this time, as current regimen appears effective.  - Determined annual cholesterol monitoring is sufficient given inability to take statins and current lipid management approach.    ## TYPE 2 DIABETES MELLITUS:  - Monitored blood sugar which were recorded as 113, 121, 128, 115, 137, 133, and 216 after breakfast.  - A1C has improved from 8.3 to 7.1, indicating better control of diabetes.  - Patient will continue current medication regimen: Ozempic 1 mg weekly, Jardiance 25 mg daily, glimepiride 4 mg daily with breakfast, and pioglitazone 45 mg daily.  - Ordered diabetes urine screen, A1C test, and basic metabolic panel with kidney function and sugar.  - Patient is stable on current oral hypoglycemic regimen.  - Discussed how diabetes can contribute to more fragile skin, which may relate to patient's bruising concerns.    ## HYPERLIPIDEMIA:  - Cholesterol was checked in April and found to be sub-optimal due to the patient's inability to take statins.  - Will continue to evaluate cholesterol annually.  - Current treatment includes Fenofibrate 160 mg, Zetia 10 mg, and omega fish oil.    ## FATIGUE:  - Patient reports feeling tired and fatigued easily but remains motivated by external factors to stay active.  - Explained the relationship between physical activity and energy levels.  - Advised patient to remain physically active despite fatigue as this can help improve energy levels over time.  - Discussed strategies for managing fatigue through continued activity and motivation.    ## BRUISING:  -  Patient experiences bruising, possibly related to aspirin and fish oil use, as well as diabetes contributing to more fragile skin.  - Recommend reducing aspirin dosage to help manage this issue.    ## ASPIRIN USE:  - Reduced aspirin dosage from 325 mg to 81 mg daily to help minimize bruising while maintaining cardiovascular benefits.    ## FOLLOW-UP:  - Scheduled follow up in 4 months, non-fasting.  - Will send a message with lab results.       Follow up in about 4 months (around 11/9/2025) for uncontrolled diabetes.    Signature:  HOSSEIN Lambert-BC    Future Appointments   Date Time Provider Department Center   11/5/2025 10:00 AM AWV NURSE, [facility] [place]   11/6/2025  3:40 PM Jil Barajas FNP [facility] [place]   4/23/2026  1:00 PM Luba Baird MD [facility]     This note was generated with the assistance of ambient listening technology. Verbal consent was obtained by the patient and accompanying visitor(s) for the recording of patient appointment to facilitate this note. I attest to having reviewed and edited the generated note for accuracy, though some syntax or spelling errors may persist. Please contact the author of this note for any clarification.           [1]   Social History  Tobacco Use   Smoking Status Never    Passive exposure: Never   Smokeless Tobacco Never